# Patient Record
Sex: FEMALE | Race: WHITE | NOT HISPANIC OR LATINO
[De-identification: names, ages, dates, MRNs, and addresses within clinical notes are randomized per-mention and may not be internally consistent; named-entity substitution may affect disease eponyms.]

---

## 2021-06-09 ENCOUNTER — APPOINTMENT (OUTPATIENT)
Dept: ANTEPARTUM | Facility: CLINIC | Age: 31
End: 2021-06-09
Payer: COMMERCIAL

## 2021-06-09 ENCOUNTER — ASOB RESULT (OUTPATIENT)
Age: 31
End: 2021-06-09

## 2021-06-09 PROBLEM — Z00.00 ENCOUNTER FOR PREVENTIVE HEALTH EXAMINATION: Status: ACTIVE | Noted: 2021-06-09

## 2021-06-09 PROCEDURE — 99072 ADDL SUPL MATRL&STAF TM PHE: CPT

## 2021-06-09 PROCEDURE — 76813 OB US NUCHAL MEAS 1 GEST: CPT

## 2021-08-05 ENCOUNTER — ASOB RESULT (OUTPATIENT)
Age: 31
End: 2021-08-05

## 2021-08-05 ENCOUNTER — APPOINTMENT (OUTPATIENT)
Dept: ANTEPARTUM | Facility: CLINIC | Age: 31
End: 2021-08-05
Payer: COMMERCIAL

## 2021-08-05 PROCEDURE — 76811 OB US DETAILED SNGL FETUS: CPT

## 2021-08-17 ENCOUNTER — ASOB RESULT (OUTPATIENT)
Age: 31
End: 2021-08-17

## 2021-08-17 ENCOUNTER — APPOINTMENT (OUTPATIENT)
Dept: ANTEPARTUM | Facility: CLINIC | Age: 31
End: 2021-08-17
Payer: COMMERCIAL

## 2021-08-17 ENCOUNTER — NON-APPOINTMENT (OUTPATIENT)
Age: 31
End: 2021-08-17

## 2021-08-17 PROCEDURE — 76817 TRANSVAGINAL US OBSTETRIC: CPT

## 2021-09-02 ENCOUNTER — ASOB RESULT (OUTPATIENT)
Age: 31
End: 2021-09-02

## 2021-09-02 ENCOUNTER — APPOINTMENT (OUTPATIENT)
Dept: ANTEPARTUM | Facility: CLINIC | Age: 31
End: 2021-09-02
Payer: COMMERCIAL

## 2021-09-02 PROCEDURE — 76815 OB US LIMITED FETUS(S): CPT | Mod: 59

## 2021-09-02 PROCEDURE — 76817 TRANSVAGINAL US OBSTETRIC: CPT

## 2021-09-15 ENCOUNTER — ASOB RESULT (OUTPATIENT)
Age: 31
End: 2021-09-15

## 2021-09-15 ENCOUNTER — APPOINTMENT (OUTPATIENT)
Dept: MATERNAL FETAL MEDICINE | Facility: CLINIC | Age: 31
End: 2021-09-15
Payer: COMMERCIAL

## 2021-09-15 VITALS — WEIGHT: 173 LBS | BODY MASS INDEX: 30.65 KG/M2 | HEIGHT: 63 IN

## 2021-09-15 DIAGNOSIS — Z83.3 FAMILY HISTORY OF DIABETES MELLITUS: ICD-10-CM

## 2021-09-15 PROCEDURE — G0108 DIAB MANAGE TRN  PER INDIV: CPT | Mod: 95

## 2021-09-28 ENCOUNTER — TRANSCRIPTION ENCOUNTER (OUTPATIENT)
Age: 31
End: 2021-09-28

## 2021-09-28 ENCOUNTER — ASOB RESULT (OUTPATIENT)
Age: 31
End: 2021-09-28

## 2021-09-28 ENCOUNTER — APPOINTMENT (OUTPATIENT)
Dept: ANTEPARTUM | Facility: CLINIC | Age: 31
End: 2021-09-28
Payer: COMMERCIAL

## 2021-09-28 ENCOUNTER — APPOINTMENT (OUTPATIENT)
Dept: MATERNAL FETAL MEDICINE | Facility: CLINIC | Age: 31
End: 2021-09-28
Payer: COMMERCIAL

## 2021-09-28 VITALS
RESPIRATION RATE: 16 BRPM | DIASTOLIC BLOOD PRESSURE: 68 MMHG | SYSTOLIC BLOOD PRESSURE: 92 MMHG | WEIGHT: 173 LBS | BODY MASS INDEX: 30.65 KG/M2 | HEIGHT: 63 IN | HEART RATE: 76 BPM | OXYGEN SATURATION: 99 %

## 2021-09-28 DIAGNOSIS — Z86.59 PERSONAL HISTORY OF OTHER MENTAL AND BEHAVIORAL DISORDERS: ICD-10-CM

## 2021-09-28 PROCEDURE — 99204 OFFICE O/P NEW MOD 45 MIN: CPT

## 2021-09-28 PROCEDURE — 76816 OB US FOLLOW-UP PER FETUS: CPT

## 2021-09-28 RX ORDER — PNV NO.95/FERROUS FUM/FOLIC AC 28MG-0.8MG
27-0.8 TABLET ORAL DAILY
Refills: 0 | Status: ACTIVE | COMMUNITY
Start: 2021-09-28

## 2021-09-28 NOTE — ACTIVE PROBLEMS
[Hypertension] : no hypertension [Heart Disease] : no heart disease [Autoimmune Disease] : no autoimmune disease [Renal Disease] : no kidney disease, no UTI [Neurologic Disorder] : no neurologic disorder, no epilepsy [Depression] : no depression, no post partum depression [Hepatic Disorder] : no hepatitis, no liver disease [Thrombophlebitis] : no varicosities, no phlebitis [Thyroid Disorder] : no thyroid dysfunction [Trauma] : no trauma/violence [Blood Transfusion (___ Ml)] : no history of blood transfusion

## 2021-09-28 NOTE — VITALS
[LMP (date): ___] : LMP was on [unfilled] [GA =___ Weeks] : which calculates to a GA of [unfilled] weeks [GA= ___ Days] : and [unfilled] day(s) [AMOR by LMP (date): ___] : The calculated AMOR by LMP is [unfilled] [By LMP] : this is the final AMOR

## 2021-09-28 NOTE — FAMILY HISTORY
[Age 35+ During Pregnancy] : not 35 or over during pregnancy [Reported Family History Of Birth Defects] : no congenital heart defects [Murali-Sachs Carrier] : no Murali-Sachs [Family History] : no mental retardation/autism [Reported Family History Of Genetic Disease] : no history of child defect in child of baby father

## 2021-09-28 NOTE — DISCUSSION/SUMMARY
[FreeTextEntry1] : We had the pleasure of meeting with your patient, Jackie Goodwin, for a maternal-fetal medicine consultation. As you know, the patient is a 31-year-old  at 28 1/7 weeks with a pregnancy complicated by gestational diabetes and anxiety (no meds). \par \par She reports no complaints today. She reports good fetal movement and denies contractions, leaking and vaginal bleeding.\par \par She is currently taking prenatal vitamins. \par \par She was extensively counseled regarding the following issues:\par \par •	Gestational diabetes\par \par Jackie was counseled regarding diet, blood sugar testing, and managing diabetes during pregnancy. Tight glycemic control in pregnancy is necessary to decrease fetal and  risks including macrosomia, shoulder dystocia, medically or obstetrically-indicated  delivery,  section, polyhydramnios, and preeclampsia. It was discussed that women who are able to maintain good glycemic control with diet and/or medication generally have favorable obstetric outcomes. She understands that fasting glucose values should be <90 and 1-hour postprandial values should be <140. Her fingerstick log was reviewed. Fasting fingerstick glucose values are persistently elevated. Postprandial values are mostly at goal. She was instructed to continue checking fingersticks 4 times daily and to bring her log to all appointments. \par \par I recommend starting insulin NPH 12 units at bedtime—prescription sent to pharmacy. Her fingerstick log will be reassessed at her next visit to determine whether further adjustment of her insulin regimen is necessary. \par \par She is encouraged to have a two-hour glucose tolerance test at 6-8 weeks postpartum to evaluate for diabetes mellitus and insulin resistance. \par \par In general, a patient with gestational diabetes well-controlled on insulin should be delivered no earlier than 39 0/7 weeks and no later than 39 6/7 weeks. However, if glycemic control is determined to be poor then earlier delivery may be recommended to avoid fetopathy and stillbirth.\par \par She will follow-up with the diabetes educator/nutritionist on 10/13/21.\par Weekly fetal testing to start by 36 weeks.\par \par \par \par Thank you for requesting a consultation on this patient for gestational diabetes. The total time spent in preparation for this visit, medical history taking, orders, review of records, counseling the patient, and writing this note was 54 minutes.\par \par At the end of our discussion, the patient indicated that her questions were answered and she seemed satisfied with our discussion. Please do not hesitate to contact us with any questions.\par \par Sincerely,\par \par \par Dewayne Rivas MD, GUALBERTO\par Attending Physician, Maternal-Fetal Medicine\par \par

## 2021-09-28 NOTE — OB HISTORY
[LMP: ___] : LMP: [unfilled] [AMOR: ___] : AMOR: [unfilled] [EGA: ___ wks] : EGA: [unfilled] wks [Spontaneous] : Spontaneous conception [FreeTextEntry1] : Pt states she was taking Lexapro until about 12 weeks gestation due to Hx Anxiety. Pt states her OB weaned her off the medication. She reports that she is doing well. [Definite:  ___ (Date)] : the last menstrual period was [unfilled] [Normal Amount/Duration] : was of a normal amount and duration [Spotting Between  Menses] : no spotting between menses [Regular Cycle Intervals] : periods have been regular [Menstrual Cramps] : menstrual cramps

## 2021-10-11 ENCOUNTER — NON-APPOINTMENT (OUTPATIENT)
Age: 31
End: 2021-10-11

## 2021-10-13 ENCOUNTER — APPOINTMENT (OUTPATIENT)
Dept: MATERNAL FETAL MEDICINE | Facility: CLINIC | Age: 31
End: 2021-10-13
Payer: COMMERCIAL

## 2021-10-13 ENCOUNTER — ASOB RESULT (OUTPATIENT)
Age: 31
End: 2021-10-13

## 2021-10-13 VITALS — WEIGHT: 178 LBS | HEIGHT: 63 IN | BODY MASS INDEX: 31.54 KG/M2

## 2021-10-13 PROCEDURE — G0108 DIAB MANAGE TRN  PER INDIV: CPT | Mod: 95

## 2021-10-26 ENCOUNTER — APPOINTMENT (OUTPATIENT)
Dept: ANTEPARTUM | Facility: CLINIC | Age: 31
End: 2021-10-26

## 2021-10-26 ENCOUNTER — APPOINTMENT (OUTPATIENT)
Dept: MATERNAL FETAL MEDICINE | Facility: CLINIC | Age: 31
End: 2021-10-26

## 2021-10-27 ENCOUNTER — APPOINTMENT (OUTPATIENT)
Dept: MATERNAL FETAL MEDICINE | Facility: CLINIC | Age: 31
End: 2021-10-27
Payer: COMMERCIAL

## 2021-10-27 ENCOUNTER — APPOINTMENT (OUTPATIENT)
Dept: ANTEPARTUM | Facility: CLINIC | Age: 31
End: 2021-10-27
Payer: COMMERCIAL

## 2021-10-27 ENCOUNTER — ASOB RESULT (OUTPATIENT)
Age: 31
End: 2021-10-27

## 2021-10-27 VITALS
SYSTOLIC BLOOD PRESSURE: 118 MMHG | BODY MASS INDEX: 31.9 KG/M2 | RESPIRATION RATE: 18 BRPM | HEIGHT: 63 IN | WEIGHT: 180.06 LBS | OXYGEN SATURATION: 98 % | DIASTOLIC BLOOD PRESSURE: 70 MMHG | HEART RATE: 98 BPM

## 2021-10-27 PROCEDURE — 76820 UMBILICAL ARTERY ECHO: CPT

## 2021-10-27 PROCEDURE — 99214 OFFICE O/P EST MOD 30 MIN: CPT

## 2021-10-27 PROCEDURE — 76816 OB US FOLLOW-UP PER FETUS: CPT

## 2021-10-27 NOTE — DISCUSSION/SUMMARY
[FreeTextEntry1] : Repeat growth scan in 4 weeks with MFM consultaiton. \par \par Medical nutrition followup is scheduled in 2 weeks. \par \par Weekly fetal testing to begin next week.

## 2021-10-27 NOTE — DATA REVIEWED
[FreeTextEntry1] : Sonogram today shows good interval growth with the overall fetal size at the 64th percentile. \par \par Review of glucose control shows good post prandial control with occasional mild elevations due to food choices. She understands the diet and will continue to adjust as needed. \par \par Her fasting values remain consistently elevated, and she just increased her nighttime NPH to 48 units.  I also instructed Jackie to begin intermittently testing overnight (2am-3am) to determine if she is bottoming out at all, or if it is safe to continue increasing the night time insulin aggressively. \par \par A followup with medical nutrition is scheduled.

## 2021-11-01 ENCOUNTER — NON-APPOINTMENT (OUTPATIENT)
Age: 31
End: 2021-11-01

## 2021-11-09 ENCOUNTER — APPOINTMENT (OUTPATIENT)
Dept: MATERNAL FETAL MEDICINE | Facility: CLINIC | Age: 31
End: 2021-11-09
Payer: COMMERCIAL

## 2021-11-09 ENCOUNTER — ASOB RESULT (OUTPATIENT)
Age: 31
End: 2021-11-09

## 2021-11-09 VITALS — WEIGHT: 180 LBS | HEIGHT: 63 IN | BODY MASS INDEX: 31.89 KG/M2

## 2021-11-09 PROCEDURE — G0108 DIAB MANAGE TRN  PER INDIV: CPT | Mod: 95

## 2021-11-24 ENCOUNTER — ASOB RESULT (OUTPATIENT)
Age: 31
End: 2021-11-24

## 2021-11-24 ENCOUNTER — APPOINTMENT (OUTPATIENT)
Dept: ANTEPARTUM | Facility: CLINIC | Age: 31
End: 2021-11-24
Payer: COMMERCIAL

## 2021-11-24 PROCEDURE — 76820 UMBILICAL ARTERY ECHO: CPT

## 2021-11-24 PROCEDURE — 76816 OB US FOLLOW-UP PER FETUS: CPT

## 2021-12-01 ENCOUNTER — APPOINTMENT (OUTPATIENT)
Dept: MATERNAL FETAL MEDICINE | Facility: CLINIC | Age: 31
End: 2021-12-01
Payer: COMMERCIAL

## 2021-12-01 ENCOUNTER — APPOINTMENT (OUTPATIENT)
Dept: ANTEPARTUM | Facility: CLINIC | Age: 31
End: 2021-12-01
Payer: COMMERCIAL

## 2021-12-01 ENCOUNTER — ASOB RESULT (OUTPATIENT)
Age: 31
End: 2021-12-01

## 2021-12-01 VITALS
HEIGHT: 63 IN | WEIGHT: 184 LBS | OXYGEN SATURATION: 98 % | RESPIRATION RATE: 18 BRPM | SYSTOLIC BLOOD PRESSURE: 120 MMHG | DIASTOLIC BLOOD PRESSURE: 70 MMHG | HEART RATE: 100 BPM | BODY MASS INDEX: 32.6 KG/M2

## 2021-12-01 PROCEDURE — 76819 FETAL BIOPHYS PROFIL W/O NST: CPT

## 2021-12-01 PROCEDURE — 76820 UMBILICAL ARTERY ECHO: CPT

## 2021-12-01 PROCEDURE — 99214 OFFICE O/P EST MOD 30 MIN: CPT

## 2021-12-01 NOTE — DATA REVIEWED
[FreeTextEntry1] : Sonogram today shows good interval growth with the BPP scoring 8/8 and the MAMTA normal at 16cm\par \par Review of glucose shows overall good control with occasional mild elevations.  The overall control is adequate. \par \par She denies any episodes of hypo or hyperglycemic symptoms, and when testing overnight, her values were not dropping too low despite the high dose of nighttime NPH\par \par We discussed the importance of continuing to test for the duration of the pregnancy, and she will reach out if her values start to dramatically change.

## 2021-12-01 NOTE — HISTORY OF PRESENT ILLNESS
[FreeTextEntry1] : Jackie presents for interval growth sonogram and evaluation of gestational diabetes

## 2021-12-01 NOTE — DISCUSSION/SUMMARY
[FreeTextEntry1] : Weekly fetal testing. \par \par Followup with medical nutrition as needed. \par \par Delivery is scheduled in 2 weeks.

## 2021-12-07 ENCOUNTER — APPOINTMENT (OUTPATIENT)
Dept: MATERNAL FETAL MEDICINE | Facility: CLINIC | Age: 31
End: 2021-12-07

## 2023-12-28 ENCOUNTER — ASOB RESULT (OUTPATIENT)
Age: 33
End: 2023-12-28

## 2023-12-28 ENCOUNTER — APPOINTMENT (OUTPATIENT)
Dept: ANTEPARTUM | Facility: CLINIC | Age: 33
End: 2023-12-28
Payer: COMMERCIAL

## 2023-12-28 PROCEDURE — 76813 OB US NUCHAL MEAS 1 GEST: CPT

## 2024-01-23 ENCOUNTER — ASOB RESULT (OUTPATIENT)
Age: 34
End: 2024-01-23

## 2024-01-23 ENCOUNTER — APPOINTMENT (OUTPATIENT)
Dept: MATERNAL FETAL MEDICINE | Facility: CLINIC | Age: 34
End: 2024-01-23
Payer: COMMERCIAL

## 2024-01-23 PROCEDURE — G0108 DIAB MANAGE TRN  PER INDIV: CPT | Mod: 95

## 2024-01-23 RX ORDER — PEN NEEDLE, DIABETIC 29 G X1/2"
32G X 4 MM NEEDLE, DISPOSABLE MISCELLANEOUS
Qty: 1 | Refills: 2 | Status: DISCONTINUED | COMMUNITY
Start: 2021-09-28 | End: 2024-01-23

## 2024-01-23 RX ORDER — LANCETS 33 GAUGE
EACH MISCELLANEOUS
Qty: 1 | Refills: 7 | Status: ACTIVE | COMMUNITY
Start: 2024-01-23 | End: 1900-01-01

## 2024-01-23 RX ORDER — BLOOD-GLUCOSE METER
W/DEVICE EACH MISCELLANEOUS
Qty: 1 | Refills: 0 | Status: DISCONTINUED | COMMUNITY
Start: 2021-09-15 | End: 2024-01-23

## 2024-01-23 RX ORDER — BLOOD SUGAR DIAGNOSTIC
STRIP MISCELLANEOUS 4 TIMES DAILY
Qty: 1 | Refills: 6 | Status: ACTIVE | COMMUNITY
Start: 2024-01-23 | End: 1900-01-01

## 2024-01-23 RX ORDER — BLOOD-GLUCOSE METER
W/DEVICE EACH MISCELLANEOUS
Qty: 1 | Refills: 0 | Status: ACTIVE | COMMUNITY
Start: 2024-01-23 | End: 1900-01-01

## 2024-01-23 RX ORDER — BLOOD SUGAR DIAGNOSTIC
STRIP MISCELLANEOUS
Qty: 3 | Refills: 3 | Status: DISCONTINUED | COMMUNITY
Start: 2021-09-15 | End: 2024-01-23

## 2024-01-23 RX ORDER — HUMAN INSULIN 100 [IU]/ML
100 INJECTION, SUSPENSION SUBCUTANEOUS
Qty: 1 | Refills: 2 | Status: DISCONTINUED | COMMUNITY
Start: 2021-09-28 | End: 2024-01-23

## 2024-01-23 RX ORDER — LANCETS 33 GAUGE
EACH MISCELLANEOUS
Qty: 1 | Refills: 3 | Status: DISCONTINUED | COMMUNITY
Start: 2021-09-15 | End: 2024-01-23

## 2024-01-30 ENCOUNTER — ASOB RESULT (OUTPATIENT)
Age: 34
End: 2024-01-30

## 2024-01-30 ENCOUNTER — APPOINTMENT (OUTPATIENT)
Dept: MATERNAL FETAL MEDICINE | Facility: CLINIC | Age: 34
End: 2024-01-30
Payer: COMMERCIAL

## 2024-01-30 VITALS — BODY MASS INDEX: 31.01 KG/M2 | WEIGHT: 175 LBS | HEIGHT: 63 IN

## 2024-01-30 PROCEDURE — G0108 DIAB MANAGE TRN  PER INDIV: CPT | Mod: 95

## 2024-01-31 RX ORDER — INSULIN HUMAN 100 [IU]/ML
100 INJECTION, SUSPENSION SUBCUTANEOUS
Qty: 2 | Refills: 5 | Status: DISCONTINUED | COMMUNITY
Start: 2024-01-30 | End: 2024-01-31

## 2024-01-31 RX ORDER — BLOOD SUGAR DIAGNOSTIC
32G X 5 MM STRIP MISCELLANEOUS
Qty: 100 | Refills: 1 | Status: ACTIVE | COMMUNITY
Start: 2024-01-30 | End: 1900-01-01

## 2024-01-31 RX ORDER — ISOPROPYL ALCOHOL 0.7 ML/ML
SWAB TOPICAL
Qty: 1 | Refills: 1 | Status: ACTIVE | COMMUNITY
Start: 2024-01-30 | End: 1900-01-01

## 2024-02-08 ENCOUNTER — APPOINTMENT (OUTPATIENT)
Dept: MATERNAL FETAL MEDICINE | Facility: CLINIC | Age: 34
End: 2024-02-08
Payer: COMMERCIAL

## 2024-02-08 ENCOUNTER — ASOB RESULT (OUTPATIENT)
Age: 34
End: 2024-02-08

## 2024-02-08 VITALS — WEIGHT: 179 LBS | HEIGHT: 63 IN | BODY MASS INDEX: 31.71 KG/M2

## 2024-02-08 PROCEDURE — G0108 DIAB MANAGE TRN  PER INDIV: CPT | Mod: 95

## 2024-02-21 ENCOUNTER — APPOINTMENT (OUTPATIENT)
Dept: ANTEPARTUM | Facility: CLINIC | Age: 34
End: 2024-02-21
Payer: COMMERCIAL

## 2024-02-21 ENCOUNTER — ASOB RESULT (OUTPATIENT)
Age: 34
End: 2024-02-21

## 2024-02-21 PROCEDURE — 76817 TRANSVAGINAL US OBSTETRIC: CPT

## 2024-02-21 PROCEDURE — 76811 OB US DETAILED SNGL FETUS: CPT

## 2024-02-22 ENCOUNTER — APPOINTMENT (OUTPATIENT)
Dept: ANTEPARTUM | Facility: CLINIC | Age: 34
End: 2024-02-22

## 2024-02-22 ENCOUNTER — APPOINTMENT (OUTPATIENT)
Dept: MATERNAL FETAL MEDICINE | Facility: CLINIC | Age: 34
End: 2024-02-22
Payer: COMMERCIAL

## 2024-02-22 VITALS
WEIGHT: 194 LBS | HEART RATE: 88 BPM | SYSTOLIC BLOOD PRESSURE: 110 MMHG | BODY MASS INDEX: 34.38 KG/M2 | RESPIRATION RATE: 16 BRPM | DIASTOLIC BLOOD PRESSURE: 64 MMHG | HEIGHT: 63 IN

## 2024-02-22 DIAGNOSIS — O24.419 GESTATIONAL DIABETES MELLITUS IN PREGNANCY, UNSPECIFIED CONTROL: ICD-10-CM

## 2024-02-22 DIAGNOSIS — O24.414 GESTATIONAL DIABETES MELLITUS IN PREGNANCY, INSULIN CONTROLLED: ICD-10-CM

## 2024-02-22 PROCEDURE — 99204 OFFICE O/P NEW MOD 45 MIN: CPT

## 2024-02-22 NOTE — DISCUSSION/SUMMARY
[FreeTextEntry1] : Repeat sonogram for fetal growth is scheduled in 4 weeks, with MFM reconsultation scheduled.   Followup with medical nutrition team is scheduled.   Fetal echo referral given   Continue current insulin dose as discussed above.

## 2024-02-22 NOTE — DATA REVIEWED
[FreeTextEntry1] : Level 2 report was reviewed. The limited views of the cardiac anatomy were discussed, and a fetal echo is being scheduled to complete the fetal survey  We discussed the recommendations of increased monitoring of fetal growth and well being throughout the second and third trimester with GDM, and a followup appointment  was given.  Review of home glucose shows overall good control of her post prandial values, as Jackie has a good grasp of the diabetic diet. She finds the telemedicine visits with the medical nutrition team to be helpful.   The fasting values remain mildly elevated depite increasing doses of insulin, and she is currently taking 28u NPH QHS which was just increased last night. We discussed maintining that dose for a few days, and potentially testing overnight to assess the utility of further increases.

## 2024-03-06 ENCOUNTER — APPOINTMENT (OUTPATIENT)
Dept: ANTEPARTUM | Facility: CLINIC | Age: 34
End: 2024-03-06

## 2024-03-12 ENCOUNTER — APPOINTMENT (OUTPATIENT)
Dept: PEDIATRIC CARDIOLOGY | Facility: CLINIC | Age: 34
End: 2024-03-12
Payer: COMMERCIAL

## 2024-03-12 PROCEDURE — 76827 ECHO EXAM OF FETAL HEART: CPT

## 2024-03-12 PROCEDURE — 93325 DOPPLER ECHO COLOR FLOW MAPG: CPT

## 2024-03-12 PROCEDURE — 99202 OFFICE O/P NEW SF 15 MIN: CPT

## 2024-03-12 PROCEDURE — 76825 ECHO EXAM OF FETAL HEART: CPT

## 2024-03-14 ENCOUNTER — ASOB RESULT (OUTPATIENT)
Age: 34
End: 2024-03-14

## 2024-03-14 ENCOUNTER — APPOINTMENT (OUTPATIENT)
Dept: MATERNAL FETAL MEDICINE | Facility: CLINIC | Age: 34
End: 2024-03-14
Payer: COMMERCIAL

## 2024-03-14 PROCEDURE — G0108 DIAB MANAGE TRN  PER INDIV: CPT | Mod: 95

## 2024-03-14 RX ORDER — HUMAN INSULIN 100 [IU]/ML
100 INJECTION, SUSPENSION SUBCUTANEOUS
Qty: 1 | Refills: 3 | Status: ACTIVE | COMMUNITY
Start: 2024-03-14 | End: 1900-01-01

## 2024-03-14 RX ORDER — HUMAN INSULIN 100 [IU]/ML
100 INJECTION, SUSPENSION SUBCUTANEOUS
Qty: 1 | Refills: 1 | Status: DISCONTINUED | COMMUNITY
Start: 2024-01-31 | End: 2024-03-14

## 2024-03-21 ENCOUNTER — APPOINTMENT (OUTPATIENT)
Dept: MATERNAL FETAL MEDICINE | Facility: CLINIC | Age: 34
End: 2024-03-21
Payer: COMMERCIAL

## 2024-03-21 ENCOUNTER — APPOINTMENT (OUTPATIENT)
Dept: ANTEPARTUM | Facility: CLINIC | Age: 34
End: 2024-03-21
Payer: COMMERCIAL

## 2024-03-21 ENCOUNTER — ASOB RESULT (OUTPATIENT)
Age: 34
End: 2024-03-21

## 2024-03-21 ENCOUNTER — APPOINTMENT (OUTPATIENT)
Dept: ANTEPARTUM | Facility: CLINIC | Age: 34
End: 2024-03-21

## 2024-03-21 VITALS
HEART RATE: 74 BPM | BODY MASS INDEX: 36.14 KG/M2 | WEIGHT: 204 LBS | DIASTOLIC BLOOD PRESSURE: 68 MMHG | RESPIRATION RATE: 18 BRPM | OXYGEN SATURATION: 98 % | HEIGHT: 63 IN | SYSTOLIC BLOOD PRESSURE: 106 MMHG

## 2024-03-21 DIAGNOSIS — O09.891 SUPERVISION OF OTHER HIGH RISK PREGNANCIES, FIRST TRIMESTER: ICD-10-CM

## 2024-03-21 DIAGNOSIS — O99.210 OBESITY COMPLICATING PREGNANCY, UNSPECIFIED TRIMESTER: ICD-10-CM

## 2024-03-21 PROCEDURE — 76816 OB US FOLLOW-UP PER FETUS: CPT

## 2024-03-21 PROCEDURE — 99214 OFFICE O/P EST MOD 30 MIN: CPT

## 2024-03-21 RX ORDER — ESCITALOPRAM OXALATE 20 MG/1
20 TABLET ORAL
Refills: 0 | Status: ACTIVE | COMMUNITY

## 2024-03-21 NOTE — DISCUSSION/SUMMARY
[FreeTextEntry1] : We had the pleasure of seeing your patient for a Maternal-Fetal Medicine consultation today. She is a 34 year-old  at 24 6/7 weeks of gestation with a pregnancy complicated by the below issues.  She was extensively counseled regarding the following issues:  Gestational diabetes on insulin: The patient was counseled regarding diet, blood sugar testing, and managing diabetes during pregnancy. Tight glycemic control in pregnancy is necessary to decrease fetal and  risks including macrosomia, shoulder dystocia, medically or obstetrically-indicated  delivery,  section, polyhydramnios, and preeclampsia. It was discussed that women who are able to maintain good glycemic control with diet and/or medication generally have favorable obstetric outcomes. She understands that fasting glucose values should be <95 and 1-hour postprandial values should be <140. Fingersticks should be checked and logged 4 times daily. She is using a CDM device to monitor glucose levels. Currently, her fasting and postprandial glucose values are mostly at goal. She is encouraged to have a two-hour glucose tolerance test at 6-8 weeks postpartum to evaluate for diabetes mellitus and insulin resistance.   Obesity, class 1: Obesity is associated with an increased risk for pregnancy complications such as preeclampsia. Complications from surgery are increased, as well as failure of regional anesthesia. In addition, the fetus is at increased risk for congenital anomalies, most commonly neural tube defects and cardiac anomalies, even in the absence of diabetes.  Malformations are more difficult to assess by ultrasound evaluation due to the decreased sensitivity of ultrasound in women with a high BMI. During pregnancy, optimum weight gain recommended by the [place] [employer] 2009 Guidelines is 11-20 pounds. Furthermore, pregnant women with obesity are at a greater risk for venous thromboembolism.  Anxiety: continue lexapro  Recommend: Weekly  fetal surveillance starting at 32 weeks Serial growth ultrasounds every 4 weeks Continue insulin with titration as needed Nutrition follow-up Delivery at 39 weeks of gestation unless otherwise indicated sooner   Thank you for requesting a consultation on this patient. The total time spent in preparation for this visit, medical history taking, orders, review of records, counseling the patient, and writing this note was 35 minutes.  At the end of our discussion, the patient indicated that her questions were answered and she seemed satisfied with our discussion. Please do not hesitate to contact us with any questions.  Sincerely,   Dewayne Rivas MD, GUALBERTO Attending Physician, Maternal-Fetal Medicine

## 2024-04-04 ENCOUNTER — ASOB RESULT (OUTPATIENT)
Age: 34
End: 2024-04-04

## 2024-04-04 ENCOUNTER — APPOINTMENT (OUTPATIENT)
Dept: MATERNAL FETAL MEDICINE | Facility: CLINIC | Age: 34
End: 2024-04-04
Payer: COMMERCIAL

## 2024-04-04 VITALS — HEIGHT: 63 IN | WEIGHT: 204 LBS | BODY MASS INDEX: 36.14 KG/M2

## 2024-04-04 PROCEDURE — G0108 DIAB MANAGE TRN  PER INDIV: CPT | Mod: 95

## 2024-04-18 ENCOUNTER — APPOINTMENT (OUTPATIENT)
Dept: MATERNAL FETAL MEDICINE | Facility: CLINIC | Age: 34
End: 2024-04-18
Payer: COMMERCIAL

## 2024-04-18 ENCOUNTER — APPOINTMENT (OUTPATIENT)
Dept: ANTEPARTUM | Facility: CLINIC | Age: 34
End: 2024-04-18
Payer: COMMERCIAL

## 2024-04-18 ENCOUNTER — ASOB RESULT (OUTPATIENT)
Age: 34
End: 2024-04-18

## 2024-04-18 VITALS
HEART RATE: 100 BPM | RESPIRATION RATE: 18 BRPM | DIASTOLIC BLOOD PRESSURE: 66 MMHG | WEIGHT: 209 LBS | BODY MASS INDEX: 37.03 KG/M2 | OXYGEN SATURATION: 97 % | SYSTOLIC BLOOD PRESSURE: 106 MMHG | HEIGHT: 63 IN

## 2024-04-18 DIAGNOSIS — F41.9 OTHER MENTAL DISORDERS COMPLICATING PREGNANCY, UNSPECIFIED TRIMESTER: ICD-10-CM

## 2024-04-18 DIAGNOSIS — O99.340 OTHER MENTAL DISORDERS COMPLICATING PREGNANCY, UNSPECIFIED TRIMESTER: ICD-10-CM

## 2024-04-18 PROCEDURE — 76816 OB US FOLLOW-UP PER FETUS: CPT

## 2024-04-18 PROCEDURE — 99214 OFFICE O/P EST MOD 30 MIN: CPT

## 2024-04-18 PROCEDURE — 76819 FETAL BIOPHYS PROFIL W/O NST: CPT

## 2024-04-18 PROCEDURE — 76820 UMBILICAL ARTERY ECHO: CPT | Mod: 59

## 2024-04-18 NOTE — DATA REVIEWED
[FreeTextEntry1] : Sonogram today shows good fetal growth with the overall size at the 53rd percentile. The AC is advanced at the 84th percentile and interval growth monitoring is planned.   \Review of home glucose shows overall good control. Asha has been adjusting her nighttime NPH insulin to better control her fasting values. She recently had to reduce to 54u due to some hypoglycemic numbers, but seems to be well managed at this time. Her post prandial numbers are well controlled.   We discussed weekly fetal testing, which asha reports are being performed in your office.   A followup with Medical nutrition team was confirmed

## 2024-04-18 NOTE — DISCUSSION/SUMMARY
[FreeTextEntry1] : Continue on current insulin regimen, with adjustments as needed. Continue testing 4X daily  Repeat growth scan in 4 weeks with MFM consult at that time.   Followup is scheduled for medical nutrition in 2 weeks.

## 2024-05-02 ENCOUNTER — APPOINTMENT (OUTPATIENT)
Dept: MATERNAL FETAL MEDICINE | Facility: CLINIC | Age: 34
End: 2024-05-02
Payer: COMMERCIAL

## 2024-05-02 ENCOUNTER — ASOB RESULT (OUTPATIENT)
Age: 34
End: 2024-05-02

## 2024-05-02 VITALS — HEIGHT: 63 IN | BODY MASS INDEX: 37.03 KG/M2 | WEIGHT: 209 LBS

## 2024-05-02 PROCEDURE — G0108 DIAB MANAGE TRN  PER INDIV: CPT | Mod: 95

## 2024-05-22 ENCOUNTER — APPOINTMENT (OUTPATIENT)
Dept: ANTEPARTUM | Facility: CLINIC | Age: 34
End: 2024-05-22
Payer: COMMERCIAL

## 2024-05-22 ENCOUNTER — ASOB RESULT (OUTPATIENT)
Age: 34
End: 2024-05-22

## 2024-05-22 ENCOUNTER — APPOINTMENT (OUTPATIENT)
Dept: MATERNAL FETAL MEDICINE | Facility: CLINIC | Age: 34
End: 2024-05-22
Payer: COMMERCIAL

## 2024-05-22 VITALS
DIASTOLIC BLOOD PRESSURE: 66 MMHG | OXYGEN SATURATION: 97 % | HEIGHT: 63 IN | SYSTOLIC BLOOD PRESSURE: 114 MMHG | RESPIRATION RATE: 18 BRPM | HEART RATE: 92 BPM | WEIGHT: 210 LBS | BODY MASS INDEX: 37.21 KG/M2

## 2024-05-22 PROCEDURE — 76820 UMBILICAL ARTERY ECHO: CPT | Mod: 59

## 2024-05-22 PROCEDURE — 76819 FETAL BIOPHYS PROFIL W/O NST: CPT

## 2024-05-22 PROCEDURE — 76816 OB US FOLLOW-UP PER FETUS: CPT

## 2024-05-22 PROCEDURE — 99214 OFFICE O/P EST MOD 30 MIN: CPT

## 2024-05-22 NOTE — DISCUSSION/SUMMARY
[FreeTextEntry1] : Weekly NST/BPP is recommended.   Continue current insulin plan  Followup growth in 4 weeks is scheduled.   Schedule an ECV at [facility] if fetus remains breech at 36 weeks.

## 2024-05-22 NOTE — DATA REVIEWED
[FreeTextEntry1] : Sonogram todah shows good fetal growth with the overall size at the 65th percentile.   Of note, the fetus is breech, and a long conversation of the options with fetal malpresentation was had. Jackie is motivated to try to avoid a , and with the chance of spontaneous version diminishing with each passing week, we discussed the timing of an attempt at external cephalic version taking place around 36-37 weeks of the fetus remains breech.   Since this taked place on Labor and delivery, this should be scheduled with the MFM team at   Regarding her glucose control, she continues to do well with both her fasting and post prandial values. She is currentlu taking 56u QHS and will adjust as needed over the next few weeks.  Weekly fetal testing is advised, with delivery by 39 weeks.

## 2024-06-04 ENCOUNTER — ASOB RESULT (OUTPATIENT)
Age: 34
End: 2024-06-04

## 2024-06-04 ENCOUNTER — APPOINTMENT (OUTPATIENT)
Dept: MATERNAL FETAL MEDICINE | Facility: CLINIC | Age: 34
End: 2024-06-04
Payer: COMMERCIAL

## 2024-06-04 VITALS — HEIGHT: 63 IN | BODY MASS INDEX: 37.21 KG/M2 | WEIGHT: 210 LBS

## 2024-06-04 PROCEDURE — G0108 DIAB MANAGE TRN  PER INDIV: CPT | Mod: 95

## 2024-06-06 ENCOUNTER — APPOINTMENT (OUTPATIENT)
Dept: ANTEPARTUM | Facility: CLINIC | Age: 34
End: 2024-06-06
Payer: COMMERCIAL

## 2024-06-06 ENCOUNTER — ASOB RESULT (OUTPATIENT)
Age: 34
End: 2024-06-06

## 2024-06-06 PROCEDURE — 76820 UMBILICAL ARTERY ECHO: CPT

## 2024-06-06 PROCEDURE — 76819 FETAL BIOPHYS PROFIL W/O NST: CPT

## 2024-06-19 ENCOUNTER — APPOINTMENT (OUTPATIENT)
Dept: ANTEPARTUM | Facility: CLINIC | Age: 34
End: 2024-06-19

## 2024-06-19 ENCOUNTER — ASOB RESULT (OUTPATIENT)
Age: 34
End: 2024-06-19

## 2024-06-19 ENCOUNTER — APPOINTMENT (OUTPATIENT)
Dept: ANTEPARTUM | Facility: CLINIC | Age: 34
End: 2024-06-19
Payer: COMMERCIAL

## 2024-06-19 ENCOUNTER — APPOINTMENT (OUTPATIENT)
Dept: MATERNAL FETAL MEDICINE | Facility: CLINIC | Age: 34
End: 2024-06-19
Payer: COMMERCIAL

## 2024-06-19 VITALS
RESPIRATION RATE: 18 BRPM | HEART RATE: 90 BPM | BODY MASS INDEX: 37.74 KG/M2 | WEIGHT: 213 LBS | DIASTOLIC BLOOD PRESSURE: 70 MMHG | HEIGHT: 63 IN | SYSTOLIC BLOOD PRESSURE: 120 MMHG | OXYGEN SATURATION: 98 %

## 2024-06-19 DIAGNOSIS — O24.414 GESTATIONAL DIABETES MELLITUS IN PREGNANCY, INSULIN CONTROLLED: ICD-10-CM

## 2024-06-19 DIAGNOSIS — O32.9XX0 MATERNAL CARE FOR MALPRESENTATION OF FETUS, UNSPECIFIED, NOT APPLICABLE OR UNSPECIFIED: ICD-10-CM

## 2024-06-19 PROCEDURE — 99214 OFFICE O/P EST MOD 30 MIN: CPT

## 2024-06-19 PROCEDURE — 76816 OB US FOLLOW-UP PER FETUS: CPT

## 2024-06-19 PROCEDURE — 76820 UMBILICAL ARTERY ECHO: CPT | Mod: 59

## 2024-06-19 PROCEDURE — 76819 FETAL BIOPHYS PROFIL W/O NST: CPT

## 2024-06-19 NOTE — DISCUSSION/SUMMARY
[FreeTextEntry1] : NST later today in Dr Canela office.   If reassuring, then a repeat BPP is scheduled in [place] on Friday  Continue insulin as ordered.

## 2024-06-19 NOTE — DATA REVIEWED
[FreeTextEntry1] : Sonogram today shows the fetus to continue being in breech presentation.   Good interval growth is noted with the overall size at the 49th percentile.   Review of home glucose shows good control of fasting and post prandial values, as she continues to increase the Novolin to 60u QHS  SHe has decided agains a trial for version, instead planning a  if the baby remains breech.   Of note, during todays BPP, there was sluggish movement giving the score of 6/8, although she reports good movement during the day. She is scheduled for NST in your office today. If that is reassuring making the BPP8/10, then a followup BPP is scheduled here on Friday

## 2024-06-21 ENCOUNTER — APPOINTMENT (OUTPATIENT)
Dept: ANTEPARTUM | Facility: CLINIC | Age: 34
End: 2024-06-21
Payer: COMMERCIAL

## 2024-06-21 ENCOUNTER — ASOB RESULT (OUTPATIENT)
Age: 34
End: 2024-06-21

## 2024-06-21 PROCEDURE — 76819 FETAL BIOPHYS PROFIL W/O NST: CPT

## 2024-06-26 ENCOUNTER — ASOB RESULT (OUTPATIENT)
Age: 34
End: 2024-06-26

## 2024-06-26 ENCOUNTER — APPOINTMENT (OUTPATIENT)
Dept: ANTEPARTUM | Facility: CLINIC | Age: 34
End: 2024-06-26
Payer: COMMERCIAL

## 2024-06-26 PROCEDURE — 76819 FETAL BIOPHYS PROFIL W/O NST: CPT

## 2024-06-26 PROCEDURE — 76820 UMBILICAL ARTERY ECHO: CPT

## 2024-07-30 ENCOUNTER — RX RENEWAL (OUTPATIENT)
Age: 34
End: 2024-07-30

## 2025-05-11 ENCOUNTER — EMERGENCY (EMERGENCY)
Facility: HOSPITAL | Age: 35
LOS: 0 days | Discharge: ANOTHER HOSPITAL | End: 2025-05-11
Attending: STUDENT IN AN ORGANIZED HEALTH CARE EDUCATION/TRAINING PROGRAM
Payer: COMMERCIAL

## 2025-05-11 ENCOUNTER — INPATIENT (INPATIENT)
Facility: HOSPITAL | Age: 35
LOS: 3 days | Discharge: HOME | End: 2025-05-15
Attending: STUDENT IN AN ORGANIZED HEALTH CARE EDUCATION/TRAINING PROGRAM | Admitting: STUDENT IN AN ORGANIZED HEALTH CARE EDUCATION/TRAINING PROGRAM
Payer: COMMERCIAL

## 2025-05-11 ENCOUNTER — TRANSCRIPTION ENCOUNTER (OUTPATIENT)
Age: 35
End: 2025-05-11

## 2025-05-11 VITALS
RESPIRATION RATE: 16 BRPM | SYSTOLIC BLOOD PRESSURE: 110 MMHG | TEMPERATURE: 98 F | HEART RATE: 88 BPM | DIASTOLIC BLOOD PRESSURE: 75 MMHG | OXYGEN SATURATION: 100 %

## 2025-05-11 VITALS
WEIGHT: 143.08 LBS | TEMPERATURE: 98 F | SYSTOLIC BLOOD PRESSURE: 109 MMHG | DIASTOLIC BLOOD PRESSURE: 67 MMHG | RESPIRATION RATE: 18 BRPM | HEIGHT: 63 IN | OXYGEN SATURATION: 98 % | HEART RATE: 70 BPM

## 2025-05-11 VITALS
SYSTOLIC BLOOD PRESSURE: 112 MMHG | HEIGHT: 63 IN | DIASTOLIC BLOOD PRESSURE: 79 MMHG | WEIGHT: 139.99 LBS | RESPIRATION RATE: 19 BRPM | TEMPERATURE: 98 F | OXYGEN SATURATION: 99 % | HEART RATE: 103 BPM

## 2025-05-11 DIAGNOSIS — Z88.2 ALLERGY STATUS TO SULFONAMIDES: ICD-10-CM

## 2025-05-11 DIAGNOSIS — S02.612A FRACTURE OF CONDYLAR PROCESS OF LEFT MANDIBLE, INITIAL ENCOUNTER FOR CLOSED FRACTURE: ICD-10-CM
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
## 2025-05-11 DIAGNOSIS — F41.9 ANXIETY DISORDER, UNSPECIFIED: ICD-10-CM

## 2025-05-11 DIAGNOSIS — S01.81XA LACERATION WITHOUT FOREIGN BODY OF OTHER PART OF HEAD, INITIAL ENCOUNTER: ICD-10-CM

## 2025-05-11 DIAGNOSIS — E78.5 HYPERLIPIDEMIA, UNSPECIFIED: ICD-10-CM
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
## 2025-05-11 DIAGNOSIS — Z23 ENCOUNTER FOR IMMUNIZATION: ICD-10-CM

## 2025-05-11 DIAGNOSIS — R55 SYNCOPE AND COLLAPSE: ICD-10-CM

## 2025-05-11 LAB
ALBUMIN SERPL ELPH-MCNC: 3.8 G/DL — SIGNIFICANT CHANGE UP (ref 3.3–5)
ALP SERPL-CCNC: 69 U/L — SIGNIFICANT CHANGE UP (ref 40–120)
ALT FLD-CCNC: 97 U/L — HIGH (ref 12–78)
ANION GAP SERPL CALC-SCNC: 7 MMOL/L — SIGNIFICANT CHANGE UP (ref 5–17)
APTT BLD: 29.9 SEC — SIGNIFICANT CHANGE UP (ref 26.1–36.8)
AST SERPL-CCNC: 57 U/L — HIGH (ref 15–37)
BASOPHILS # BLD AUTO: 0 K/UL — SIGNIFICANT CHANGE UP (ref 0–0.2)
BASOPHILS NFR BLD AUTO: 0 % — SIGNIFICANT CHANGE UP (ref 0–2)
BILIRUB SERPL-MCNC: 0.7 MG/DL — SIGNIFICANT CHANGE UP (ref 0.2–1.2)
BUN SERPL-MCNC: 16 MG/DL — SIGNIFICANT CHANGE UP (ref 7–23)
CALCIUM SERPL-MCNC: 8.8 MG/DL — SIGNIFICANT CHANGE UP (ref 8.5–10.1)
CHLORIDE SERPL-SCNC: 106 MMOL/L — SIGNIFICANT CHANGE UP (ref 96–108)
CO2 SERPL-SCNC: 24 MMOL/L — SIGNIFICANT CHANGE UP (ref 22–31)
CREAT SERPL-MCNC: 0.79 MG/DL — SIGNIFICANT CHANGE UP (ref 0.5–1.3)
D DIMER BLD IA.RAPID-MCNC: 334 NG/ML DDU — HIGH
EGFR: 99 ML/MIN/1.73M2 — SIGNIFICANT CHANGE UP
EGFR: 99 ML/MIN/1.73M2 — SIGNIFICANT CHANGE UP
EOSINOPHIL # BLD AUTO: 0.12 K/UL — SIGNIFICANT CHANGE UP (ref 0–0.5)
EOSINOPHIL NFR BLD AUTO: 4 % — SIGNIFICANT CHANGE UP (ref 0–6)
GLUCOSE SERPL-MCNC: 83 MG/DL — SIGNIFICANT CHANGE UP (ref 70–99)
HCT VFR BLD CALC: 41.6 % — SIGNIFICANT CHANGE UP (ref 34.5–45)
HGB BLD-MCNC: 14.1 G/DL — SIGNIFICANT CHANGE UP (ref 11.5–15.5)
INR BLD: 0.99 RATIO — SIGNIFICANT CHANGE UP (ref 0.85–1.16)
LYMPHOCYTES # BLD AUTO: 0.98 K/UL — LOW (ref 1–3.3)
LYMPHOCYTES # BLD AUTO: 33 % — SIGNIFICANT CHANGE UP (ref 13–44)
MAGNESIUM SERPL-MCNC: 2.1 MG/DL — SIGNIFICANT CHANGE UP (ref 1.6–2.6)
MCHC RBC-ENTMCNC: 28.9 PG — SIGNIFICANT CHANGE UP (ref 27–34)
MCHC RBC-ENTMCNC: 33.9 G/DL — SIGNIFICANT CHANGE UP (ref 32–36)
MCV RBC AUTO: 85.2 FL — SIGNIFICANT CHANGE UP (ref 80–100)
MONOCYTES # BLD AUTO: 0.33 K/UL — SIGNIFICANT CHANGE UP (ref 0–0.9)
MONOCYTES NFR BLD AUTO: 11 % — SIGNIFICANT CHANGE UP (ref 2–14)
NEUTROPHILS # BLD AUTO: 1.54 K/UL — LOW (ref 1.8–7.4)
NEUTROPHILS NFR BLD AUTO: 52 % — SIGNIFICANT CHANGE UP (ref 43–77)
NRBC BLD AUTO-RTO: SIGNIFICANT CHANGE UP /100 WBCS (ref 0–0)
PLATELET # BLD AUTO: 177 K/UL — SIGNIFICANT CHANGE UP (ref 150–400)
POTASSIUM SERPL-MCNC: 3.7 MMOL/L — SIGNIFICANT CHANGE UP (ref 3.5–5.3)
POTASSIUM SERPL-SCNC: 3.7 MMOL/L — SIGNIFICANT CHANGE UP (ref 3.5–5.3)
PROT SERPL-MCNC: 7.3 GM/DL — SIGNIFICANT CHANGE UP (ref 6–8.3)
PROTHROM AB SERPL-ACNC: 11.5 SEC — SIGNIFICANT CHANGE UP (ref 9.9–13.4)
RBC # BLD: 4.88 M/UL — SIGNIFICANT CHANGE UP (ref 3.8–5.2)
RBC # FLD: 12.2 % — SIGNIFICANT CHANGE UP (ref 10.3–14.5)
SODIUM SERPL-SCNC: 137 MMOL/L — SIGNIFICANT CHANGE UP (ref 135–145)
TROPONIN I, HIGH SENSITIVITY RESULT: 18.6 NG/L — SIGNIFICANT CHANGE UP
WBC # BLD: 2.97 K/UL — LOW (ref 3.8–10.5)
WBC # FLD AUTO: 2.97 K/UL — LOW (ref 3.8–10.5)

## 2025-05-11 PROCEDURE — 71275 CT ANGIOGRAPHY CHEST: CPT | Mod: 26

## 2025-05-11 PROCEDURE — 70486 CT MAXILLOFACIAL W/O DYE: CPT | Mod: 26

## 2025-05-11 PROCEDURE — 70450 CT HEAD/BRAIN W/O DYE: CPT | Mod: 26

## 2025-05-11 PROCEDURE — 71046 X-RAY EXAM CHEST 2 VIEWS: CPT | Mod: 26

## 2025-05-11 PROCEDURE — 93010 ELECTROCARDIOGRAM REPORT: CPT

## 2025-05-11 PROCEDURE — 99285 EMERGENCY DEPT VISIT HI MDM: CPT

## 2025-05-11 RX ORDER — ACETAMINOPHEN 500 MG/5ML
1000 LIQUID (ML) ORAL ONCE
Refills: 0 | Status: COMPLETED | OUTPATIENT
Start: 2025-05-11 | End: 2025-05-11

## 2025-05-11 RX ORDER — CLOSTRIDIUM TETANI TOXOID ANTIGEN (FORMALDEHYDE INACTIVATED), CORYNEBACTERIUM DIPHTHERIAE TOXOID ANTIGEN (FORMALDEHYDE INACTIVATED), BORDETELLA PERTUSSIS TOXOID ANTIGEN (GLUTARALDEHYDE INACTIVATED), BORDETELLA PERTUSSIS FILAMENTOUS HEMAGGLUTININ ANTIGEN (FORMALDEHYDE INACTIVATED), BORDETELLA PERTUSSIS PERTACTIN ANTIGEN, AND BORDETELLA PERTUSSIS FIMBRIAE 2/3 ANTIGEN 5; 2; 2.5; 5; 3; 5 [LF]/.5ML; [LF]/.5ML; UG/.5ML; UG/.5ML; UG/.5ML; UG/.5ML
0.5 INJECTION, SUSPENSION INTRAMUSCULAR ONCE
Refills: 0 | Status: COMPLETED | OUTPATIENT
Start: 2025-05-11 | End: 2025-05-11

## 2025-05-11 RX ADMIN — Medication 400 MILLIGRAM(S): at 16:27

## 2025-05-11 RX ADMIN — CLOSTRIDIUM TETANI TOXOID ANTIGEN (FORMALDEHYDE INACTIVATED), CORYNEBACTERIUM DIPHTHERIAE TOXOID ANTIGEN (FORMALDEHYDE INACTIVATED), BORDETELLA PERTUSSIS TOXOID ANTIGEN (GLUTARALDEHYDE INACTIVATED), BORDETELLA PERTUSSIS FILAMENTOUS HEMAGGLUTININ ANTIGEN (FORMALDEHYDE INACTIVATED), BORDETELLA PERTUSSIS PERTACTIN ANTIGEN, AND BORDETELLA PERTUSSIS FIMBRIAE 2/3 ANTIGEN 0.5 MILLILITER(S): 5; 2; 2.5; 5; 3; 5 INJECTION, SUSPENSION INTRAMUSCULAR at 16:32

## 2025-05-11 RX ADMIN — Medication 1000 MILLILITER(S): at 16:27

## 2025-05-11 NOTE — ED ADULT NURSE NOTE - NS_NURSE_BED_LOCATION_ED_ALL_ED
Patient retrieved numbers from her phone. Patient educated that she will not receive her phone again until discharge and the patient understood. Patient's valuables placed in a security bag and sent to the safe. No

## 2025-05-11 NOTE — ED PROVIDER NOTE - ATTENDING APP SHARED VISIT CONTRIBUTION OF CARE
35-year-old female with a past medical history of anxiety/depression, hyperlipidemia, who presents for evaluation of syncopal event.  She reports that she was cooking something in the kitchen when she felt lightheaded and placed her arms and then fainted.  She denies any associated chest pain or shortness of breath.  She reports significant left upper dental pain and jaw pain and swelling.  She denies any neck pain or back pain.  She notes a chin laceration.  Agree with physical exam as above- notable for small left upper molar tooth cip, chin laceration, left mandibular pain and swelling, trismus, no focal deficits, no midline spinal tenderness.    Plan for CT head and facial bones to evaluate for fracture or traumatic injury, check troponin to rule out electrolyte abnormalities or anemia D-dimer to further risk stratify for PE as patient on OCPs.  EKG nonischemic  - ct notable for left mandibular fx, d/w OMFS for transfer

## 2025-05-11 NOTE — ED ADULT NURSE NOTE - OBJECTIVE STATEMENT
35 year old female with PMH of Pre diabetes, Anxiety, Depression and High cholesterol. pt  fainted in her kitchen and struck her chin on the floor an hour  before arrival to ED. Pt with  c/o Left facial pan and laceration under the chin. Pt reports taking zepbound x few months for weightloss. Pt denies changes in vision, N/V

## 2025-05-11 NOTE — ED PROVIDER NOTE - PROGRESS NOTE DETAILS
NP Kaitlyn: Present in ER to repair patient's chin laceration.  Labs pertinent for D-dimer 334, otherwise nonactionable.  CT head without skull fracture ICH or jaw fracture.  All results discussed with patient and , will order CTA chest rule out PE 2/2 elevated D-dimer.  Disposition pending CTA results. NP Kaitlyn: CT max/face with Acute displaced and angulated left mandibular condyle fracture. Transfer center called, OMFS consulted placed, per conversation with OMFS will transfer ED-ED NP O'Laya: Present in ER to repair patient's chin laceration.  Labs pertinent for D-dimer 334, otherwise nonactionable.  CT head without skull fracture or ICH, CT max/face results pending.  All results discussed with patient and , will order CTA chest rule out PE 2/2 elevated D-dimer.  Disposition pending CTA results. YARON Sahni: CT max/face with Acute displaced and angulated left mandibular condyle fracture. Transfer center called, OMFS consulted placed, per conversation with OMFS will transfer ED-ED to [facility] for OMFS consult under accepting physician Dr Burton Jolley. Patient/ aware of and in agreement with plan of care. CTA chest pending.

## 2025-05-11 NOTE — ED ADULT TRIAGE NOTE - CHIEF COMPLAINT QUOTE
pt  fainted in her kitchen and struck her head on the floor a hour  ago. c/o l facial pan and laceration under the chin. also  c/o headache. history of  anxiety, depression and high cholesterol. takes zepbound for weigh loss. fs 98

## 2025-05-11 NOTE — ED PROVIDER NOTE - CLINICAL SUMMARY MEDICAL DECISION MAKING FREE TEXT BOX
NP Kaitlyn: Patient is 35-year-old female PMH anxiety, depression, hyperlipidemia, on something for weight loss presents with complaint of lightheadedness with syncope after going from sitting to standing kitchen today, positive head strike, patient sustained laceration under chin.  Patient states she sometimes has episodes of lightheadedness when she goes from sitting to standing, and only grabs onto something and is able to overcome the lightheadedness.  Patient denies CP, SOB, neck or back pain, persistent double or blurred vision, nausea or vomiting, numbness or weakness of the arms or legs.  Patient unsure of last tetanus.  Patient is nontoxic-appearing.  PE as above pertinent for no focal neurologic deficits left lateral scalp tender to palpation without hematomas lacerations or crepitus.  Left jaw TTP, tooth #1 chip no focal neurologic deficits.  No C/T/L spinal pain.  2cm linear laceration under chin, no active bleeding.  DDx concerning for but not limited to orthostatic hypotension vs vs electrolyte imbalance. Less concern for ACS in the absence of CP, ekg is nonischemic, No ISABEL/STD/TWI. Less concern for PE but cannot PERC patient out. Less concern for ICH vs jaw fracture.   will order labs and CT head/max face, and reassess. If ddimer is positive will order CTA chest r/o PE.    Plastics Dr Sasson called 2/2 patient requesting plastics for laceration repair, Dr Bullock will come to ER to repair laceration.   disposition pending results and reassessment.

## 2025-05-11 NOTE — ED PROVIDER NOTE - PHYSICAL EXAMINATION
CONSTITUTIONAL: A&O x3, NAD, resting comfortably, well groomed  HEAD: Normocephalic, left lateral head TTP, no hematoma, laceration, crepitus. + left jaw pain. tooth 1 chipped, no visualized pulp, tooth is intact in socket, not mobile.   EYES: clear sclera and conjunctiva, PERRL, EOM intact. No nystagmus.   NECK:  Airway parent. Neck Supple.   CARDIAC: RRR, normal S1/2, no murmurs, rubs, gallops. CW non-TTP, no CW deformity.  RESPIRATORY: CTA B/L, good aeration. No wheezing, rales, adventitious breath sounds. No accessory muscle use.    ABDOMEN: soft, non-distended; non-TTP,  No RUQ/RLQ/LUQ/LLQ pain, no rebound tenderness or guarding, BS + x4 quadrants, no pulsating masses, scars. No CVA tenderness.   PERIPHERAL VASCULAR: No edema of feet and ankles. No calf tenderness. Pulses 2+ B/L.   MSK: Moving all extremities. No C/T/L spinal or paraspinal pain.   SKIN:  2cm linear laceration under chin, no active bleeding. Capillary refill < 2 seconds.   NEURO: A&Ox3, interactive, cooperative, no focal deficits, CN II-XII grossly intact. Strength 5+/5+ upper & lower extremities. Sensation to light touch, pain, intact bilaterally to both upper and lower extremities. Normal gait. Romberg and pronator drift negative. Normal rapid alternating movements and point to point test. No paresthesias.

## 2025-05-11 NOTE — ED PROVIDER NOTE - CARE PLAN
1 Principal Discharge DX:	Syncope and collapse   Principal Discharge DX:	Syncope and collapse  Secondary Diagnosis:	Mandibular fracture

## 2025-05-11 NOTE — ED ADULT NURSE NOTE - NSFALLRISKINTERV_ED_ALL_ED
Assistance OOB with selected safe patient handling equipment if applicable/Communicate fall risk and risk factors to all staff, patient, and family/Orthostatic vital signs/Provide visual cue: yellow wristband, yellow gown, etc/Reinforce activity limits and safety measures with patient and family/Call bell, personal items and telephone in reach/Instruct patient to call for assistance before getting out of bed/chair/stretcher/Non-slip footwear applied when patient is off stretcher/[place] to call system/Physically safe environment - no spills, clutter or unnecessary equipment/Purposeful Proactive Rounding/Room/bathroom lighting operational, light cord in reach

## 2025-05-11 NOTE — ED ADULT TRIAGE NOTE - CHIEF COMPLAINT QUOTE
transferred from [place] for OMFS for left mandibular fx. Pt had a syncope episode and fell on her face. No intracranial bleeding, No blood thinner. hx of depression, anxiety, HLD. EKG from [place] in chart.

## 2025-05-11 NOTE — ED ADULT TRIAGE NOTE - STATUS:
ANTICOAGULATION MANAGEMENT     Prem Taylor 69 year old male is on warfarin with therapeutic INR result. (Goal INR 2.0-3.0)    Recent labs: (last 7 days)     06/21/22  0722   INR 2.6*       ASSESSMENT       Source(s): Chart Review, Patient/Caregiver Call and Template       Warfarin doses taken: Warfarin taken as instructed    Diet: No new diet changes identified    New illness, injury, or hospitalization: No    Medication/supplement changes: None noted    Signs or symptoms of bleeding or clotting: No    Previous INR: Therapeutic last 2(+) visits    Additional findings: None       PLAN     Recommended plan for no diet, medication or health factor changes affecting INR     Dosing Instructions: continue your current warfarin dose with next INR in 8 weeks       Summary  As of 6/21/2022    Full warfarin instructions:  7.5 mg every day   Next INR check:  8/16/2022             Telephone call with Ryan who verbalizes understanding and agrees to plan    Lab visit scheduled    Education provided: Please call back if any changes to your diet, medications or how you've been taking warfarin and Importance of therapeutic range    Plan made per [facility] anticoagulation protocol    Mee Maguire RN  Anticoagulation Clinic  6/21/2022    _______________________________________________________________________     Anticoagulation Episode Summary     Current INR goal:  2.0-3.0   TTR:  100.0 % (1 y)   Target end date:  Indefinite   Send INR reminders to:  RUBY MAURICIO    Indications    Pulmonary embolism  bilateral (H) (Resolved) [I26.99]  Persistent atrial fibrillation (H) [I48.19]           Comments:           Anticoagulation Care Providers     Provider Role Specialty Phone number    Mamadou Baez MD Referring Family Medicine [phone]             Intact

## 2025-05-12 ENCOUNTER — RESULT REVIEW (OUTPATIENT)
Age: 35
End: 2025-05-12

## 2025-05-12 DIAGNOSIS — Z90.89 ACQUIRED ABSENCE OF OTHER ORGANS: Chronic | ICD-10-CM

## 2025-05-12 DIAGNOSIS — S02.609A FRACTURE OF MANDIBLE, UNSPECIFIED, INITIAL ENCOUNTER FOR CLOSED FRACTURE: ICD-10-CM

## 2025-05-12 DIAGNOSIS — Z98.891 HISTORY OF UTERINE SCAR FROM PREVIOUS SURGERY: Chronic | ICD-10-CM

## 2025-05-12 LAB
ALBUMIN SERPL ELPH-MCNC: 3.8 G/DL — SIGNIFICANT CHANGE UP (ref 3.3–5)
ALP SERPL-CCNC: 66 U/L — SIGNIFICANT CHANGE UP (ref 40–120)
ALT FLD-CCNC: 55 U/L — HIGH (ref 4–33)
ANION GAP SERPL CALC-SCNC: 12 MMOL/L — SIGNIFICANT CHANGE UP (ref 7–14)
ANISOCYTOSIS BLD QL: SLIGHT — SIGNIFICANT CHANGE UP
APTT BLD: 28.6 SEC — SIGNIFICANT CHANGE UP (ref 26.1–36.8)
AST SERPL-CCNC: 29 U/L — SIGNIFICANT CHANGE UP (ref 4–32)
BASOPHILS # BLD AUTO: 0 K/UL — SIGNIFICANT CHANGE UP (ref 0–0.2)
BASOPHILS NFR BLD AUTO: 0 % — SIGNIFICANT CHANGE UP (ref 0–2)
BILIRUB SERPL-MCNC: 0.5 MG/DL — SIGNIFICANT CHANGE UP (ref 0.2–1.2)
BLD GP AB SCN SERPL QL: NEGATIVE — SIGNIFICANT CHANGE UP
BUN SERPL-MCNC: 9 MG/DL — SIGNIFICANT CHANGE UP (ref 7–23)
CALCIUM SERPL-MCNC: 8.5 MG/DL — SIGNIFICANT CHANGE UP (ref 8.4–10.5)
CHLORIDE SERPL-SCNC: 102 MMOL/L — SIGNIFICANT CHANGE UP (ref 98–107)
CO2 SERPL-SCNC: 23 MMOL/L — SIGNIFICANT CHANGE UP (ref 22–31)
CREAT SERPL-MCNC: 0.78 MG/DL — SIGNIFICANT CHANGE UP (ref 0.5–1.3)
EGFR: 101 ML/MIN/1.73M2 — SIGNIFICANT CHANGE UP
EGFR: 101 ML/MIN/1.73M2 — SIGNIFICANT CHANGE UP
EOSINOPHIL # BLD AUTO: 0.2 K/UL — SIGNIFICANT CHANGE UP (ref 0–0.5)
EOSINOPHIL NFR BLD AUTO: 6.1 % — HIGH (ref 0–6)
GIANT PLATELETS BLD QL SMEAR: PRESENT — SIGNIFICANT CHANGE UP
GLUCOSE BLDC GLUCOMTR-MCNC: 78 MG/DL — SIGNIFICANT CHANGE UP (ref 70–99)
GLUCOSE BLDC GLUCOMTR-MCNC: 98 MG/DL — SIGNIFICANT CHANGE UP (ref 70–99)
GLUCOSE SERPL-MCNC: 82 MG/DL — SIGNIFICANT CHANGE UP (ref 70–99)
HCG SERPL-ACNC: <1 MIU/ML — SIGNIFICANT CHANGE UP
HCT VFR BLD CALC: 37.8 % — SIGNIFICANT CHANGE UP (ref 34.5–45)
HGB BLD-MCNC: 12.6 G/DL — SIGNIFICANT CHANGE UP (ref 11.5–15.5)
HYPOCHROMIA BLD QL: SLIGHT — SIGNIFICANT CHANGE UP
IANC: 1.29 K/UL — LOW (ref 1.8–7.4)
INR BLD: 0.99 RATIO — SIGNIFICANT CHANGE UP (ref 0.85–1.16)
LYMPHOCYTES # BLD AUTO: 0.91 K/UL — LOW (ref 1–3.3)
LYMPHOCYTES # BLD AUTO: 27.2 % — SIGNIFICANT CHANGE UP (ref 13–44)
MAGNESIUM SERPL-MCNC: 2 MG/DL — SIGNIFICANT CHANGE UP (ref 1.6–2.6)
MCHC RBC-ENTMCNC: 28.8 PG — SIGNIFICANT CHANGE UP (ref 27–34)
MCHC RBC-ENTMCNC: 33.3 G/DL — SIGNIFICANT CHANGE UP (ref 32–36)
MCV RBC AUTO: 86.3 FL — SIGNIFICANT CHANGE UP (ref 80–100)
MONOCYTES # BLD AUTO: 0.44 K/UL — SIGNIFICANT CHANGE UP (ref 0–0.9)
MONOCYTES NFR BLD AUTO: 13.2 % — SIGNIFICANT CHANGE UP (ref 2–14)
NEUTROPHILS # BLD AUTO: 1.49 K/UL — LOW (ref 1.8–7.4)
NEUTROPHILS NFR BLD AUTO: 43 % — SIGNIFICANT CHANGE UP (ref 43–77)
NEUTS BAND # BLD: 1.7 % — SIGNIFICANT CHANGE UP (ref 0–6)
NEUTS BAND NFR BLD: 1.7 % — SIGNIFICANT CHANGE UP (ref 0–6)
PLAT MORPH BLD: NORMAL — SIGNIFICANT CHANGE UP
PLATELET # BLD AUTO: 166 K/UL — SIGNIFICANT CHANGE UP (ref 150–400)
PLATELET COUNT - ESTIMATE: NORMAL — SIGNIFICANT CHANGE UP
POTASSIUM SERPL-MCNC: 4.2 MMOL/L — SIGNIFICANT CHANGE UP (ref 3.5–5.3)
POTASSIUM SERPL-SCNC: 4.2 MMOL/L — SIGNIFICANT CHANGE UP (ref 3.5–5.3)
PROT SERPL-MCNC: 6.5 G/DL — SIGNIFICANT CHANGE UP (ref 6–8.3)
PROTHROM AB SERPL-ACNC: 11.5 SEC — SIGNIFICANT CHANGE UP (ref 9.9–13.4)
RBC # BLD: 4.38 M/UL — SIGNIFICANT CHANGE UP (ref 3.8–5.2)
RBC # FLD: 12 % — SIGNIFICANT CHANGE UP (ref 10.3–14.5)
RBC BLD AUTO: ABNORMAL
RH IG SCN BLD-IMP: POSITIVE — SIGNIFICANT CHANGE UP
SMUDGE CELLS # BLD: PRESENT — SIGNIFICANT CHANGE UP
SODIUM SERPL-SCNC: 137 MMOL/L — SIGNIFICANT CHANGE UP (ref 135–145)
VARIANT LYMPHS # BLD: 8.8 % — HIGH (ref 0–6)
VARIANT LYMPHS NFR BLD MANUAL: 8.8 % — HIGH (ref 0–6)
WBC # BLD: 3.34 K/UL — LOW (ref 3.8–10.5)
WBC # FLD AUTO: 3.34 K/UL — LOW (ref 3.8–10.5)

## 2025-05-12 PROCEDURE — 93306 TTE W/DOPPLER COMPLETE: CPT | Mod: 26

## 2025-05-12 PROCEDURE — 99223 1ST HOSP IP/OBS HIGH 75: CPT

## 2025-05-12 RX ORDER — CLONAZEPAM 0.5 MG/1
0.25 TABLET ORAL DAILY
Refills: 0 | Status: DISCONTINUED | OUTPATIENT
Start: 2025-05-12 | End: 2025-05-15

## 2025-05-12 RX ORDER — ARIPIPRAZOLE 2 MG/1
2 TABLET ORAL ONCE
Refills: 0 | Status: COMPLETED | OUTPATIENT
Start: 2025-05-12 | End: 2025-05-12

## 2025-05-12 RX ORDER — ARIPIPRAZOLE 2 MG/1
2 TABLET ORAL DAILY
Refills: 0 | Status: DISCONTINUED | OUTPATIENT
Start: 2025-05-12 | End: 2025-05-12

## 2025-05-12 RX ORDER — ARIPIPRAZOLE 2 MG/1
4 TABLET ORAL DAILY
Refills: 0 | Status: DISCONTINUED | OUTPATIENT
Start: 2025-05-13 | End: 2025-05-15

## 2025-05-12 RX ORDER — ROSUVASTATIN CALCIUM 20 MG/1
20 TABLET, FILM COATED ORAL AT BEDTIME
Refills: 0 | Status: DISCONTINUED | OUTPATIENT
Start: 2025-05-12 | End: 2025-05-12

## 2025-05-12 RX ORDER — ACETAMINOPHEN 500 MG/5ML
1000 LIQUID (ML) ORAL ONCE
Refills: 0 | Status: COMPLETED | OUTPATIENT
Start: 2025-05-12 | End: 2025-05-12

## 2025-05-12 RX ORDER — ONDANSETRON HCL/PF 4 MG/2 ML
4 VIAL (ML) INJECTION EVERY 8 HOURS
Refills: 0 | Status: DISCONTINUED | OUTPATIENT
Start: 2025-05-12 | End: 2025-05-15

## 2025-05-12 RX ORDER — ROSUVASTATIN CALCIUM 20 MG/1
5 TABLET, FILM COATED ORAL AT BEDTIME
Refills: 0 | Status: DISCONTINUED | OUTPATIENT
Start: 2025-05-12 | End: 2025-05-15

## 2025-05-12 RX ORDER — ESCITALOPRAM OXALATE 20 MG/1
20 TABLET ORAL ONCE
Refills: 0 | Status: COMPLETED | OUTPATIENT
Start: 2025-05-12 | End: 2025-05-12

## 2025-05-12 RX ORDER — OXYCODONE HYDROCHLORIDE 30 MG/1
5 TABLET ORAL EVERY 4 HOURS
Refills: 0 | Status: DISCONTINUED | OUTPATIENT
Start: 2025-05-12 | End: 2025-05-15

## 2025-05-12 RX ORDER — IBUPROFEN 200 MG
600 TABLET ORAL EVERY 6 HOURS
Refills: 0 | Status: DISCONTINUED | OUTPATIENT
Start: 2025-05-12 | End: 2025-05-15

## 2025-05-12 RX ORDER — AMPICILLIN SODIUM AND SULBACTAM SODIUM 1; .5 G/1; G/1
3 INJECTION, POWDER, FOR SOLUTION INTRAMUSCULAR; INTRAVENOUS EVERY 6 HOURS
Refills: 0 | Status: DISCONTINUED | OUTPATIENT
Start: 2025-05-12 | End: 2025-05-13

## 2025-05-12 RX ORDER — ROSUVASTATIN CALCIUM 20 MG/1
5 TABLET, FILM COATED ORAL ONCE
Refills: 0 | Status: COMPLETED | OUTPATIENT
Start: 2025-05-12 | End: 2025-05-12

## 2025-05-12 RX ORDER — ACETAMINOPHEN 500 MG/5ML
650 LIQUID (ML) ORAL EVERY 6 HOURS
Refills: 0 | Status: DISCONTINUED | OUTPATIENT
Start: 2025-05-12 | End: 2025-05-15

## 2025-05-12 RX ORDER — SODIUM CHLORIDE 9 G/1000ML
1000 INJECTION, SOLUTION INTRAVENOUS
Refills: 0 | Status: DISCONTINUED | OUTPATIENT
Start: 2025-05-12 | End: 2025-05-15

## 2025-05-12 RX ORDER — ESCITALOPRAM OXALATE 20 MG/1
20 TABLET ORAL DAILY
Refills: 0 | Status: DISCONTINUED | OUTPATIENT
Start: 2025-05-12 | End: 2025-05-15

## 2025-05-12 RX ORDER — OXYCODONE HYDROCHLORIDE 30 MG/1
10 TABLET ORAL EVERY 4 HOURS
Refills: 0 | Status: DISCONTINUED | OUTPATIENT
Start: 2025-05-12 | End: 2025-05-15

## 2025-05-12 RX ADMIN — Medication 400 MILLIGRAM(S): at 01:05

## 2025-05-12 RX ADMIN — ARIPIPRAZOLE 2 MILLIGRAM(S): 2 TABLET ORAL at 08:10

## 2025-05-12 RX ADMIN — Medication 650 MILLIGRAM(S): at 12:32

## 2025-05-12 RX ADMIN — Medication 600 MILLIGRAM(S): at 17:49

## 2025-05-12 RX ADMIN — ROSUVASTATIN CALCIUM 5 MILLIGRAM(S): 20 TABLET, FILM COATED ORAL at 21:54

## 2025-05-12 RX ADMIN — SODIUM CHLORIDE 100 MILLILITER(S): 9 INJECTION, SOLUTION INTRAVENOUS at 10:23

## 2025-05-12 RX ADMIN — AMPICILLIN SODIUM AND SULBACTAM SODIUM 200 GRAM(S): 1; .5 INJECTION, POWDER, FOR SOLUTION INTRAMUSCULAR; INTRAVENOUS at 09:32

## 2025-05-12 RX ADMIN — AMPICILLIN SODIUM AND SULBACTAM SODIUM 200 GRAM(S): 1; .5 INJECTION, POWDER, FOR SOLUTION INTRAMUSCULAR; INTRAVENOUS at 21:54

## 2025-05-12 RX ADMIN — ESCITALOPRAM OXALATE 20 MILLIGRAM(S): 20 TABLET ORAL at 07:54

## 2025-05-12 RX ADMIN — AMPICILLIN SODIUM AND SULBACTAM SODIUM 200 GRAM(S): 1; .5 INJECTION, POWDER, FOR SOLUTION INTRAMUSCULAR; INTRAVENOUS at 13:46

## 2025-05-12 RX ADMIN — ARIPIPRAZOLE 2 MILLIGRAM(S): 2 TABLET ORAL at 17:49

## 2025-05-12 RX ADMIN — ROSUVASTATIN CALCIUM 5 MILLIGRAM(S): 20 TABLET, FILM COATED ORAL at 07:55

## 2025-05-12 RX ADMIN — Medication 650 MILLIGRAM(S): at 12:02

## 2025-05-12 RX ADMIN — Medication 15 MILLILITER(S): at 17:49

## 2025-05-12 RX ADMIN — Medication 600 MILLIGRAM(S): at 18:19

## 2025-05-12 NOTE — CONSULT NOTE ADULT - ASSESSMENT
35-year-old female, history of anxiety and depression on Klonopin and Abilify, hyperlipidemia on rosuvastatin, presents as a transfer from [place] for OMFS evaluation.  Patient reports that earlier today she got up too fast from the couch and syncopized.  She woke up on the ground with a chin laceration.  Went to [facility] and received 20 stitches and a CT max face which showed an acute displaced and angulated left mandibular condyle fracture. Transferred ED-ED to [facility] for OMFS consult. Patient currently denies any fever, chest pain, dizziness, shortness of breath, abdominal pain, nausea or vomiting.  Is still in significant pain in her left jaw.  Denies any prior history of syncope.  Not on any blood thinners. Consult iso of initial syncope history. Noted to have had LOC while standing from couch. Denies decreased OP intakes, prior episodes, cardiac history, or aura prior to syncope.     #Syncope  -Differential: orthostatic vs vasovagal vs cardiogenic  > order orthostatics check   > order maintenance fluids  > cardiac w/u: EKG, TTE  > transfer to tele  > consideration for compression stockings upon discharge 35F, history of anxiety and depression on Klonopin and Abilify, hyperlipidemia on rosuvastatin, Zepbound (for [facility]) presents as a transfer from [place] for OMFS evaluation.  Patient reports that earlier today she got up too fast from the couch and syncopized.  She woke up on the ground with a chin laceration.  Went to [facility] and received 20 stitches and a CT max face which showed an acute displaced and angulated left mandibular condyle fracture. Transferred ED-ED to [facility] for OMFS consult. Patient currently denies any fever, chest pain, dizziness, shortness of breath, abdominal pain, nausea or vomiting.  Is still in significant pain in her left jaw.  Denies any prior history of syncope.  Not on any blood thinners. Consult iso of initial syncope history. Noted to have had LOC while standing from couch. Endorsed decreased PO intake. States day of incident, had taken klonopin in AM; a couple hrs later, noted to have gotten off couch, experienced a moment of warmth and passed out. States she has had recurrent episodes of dizziness and warmth, but this is her first episode of LOC. States no prior cardiac history.     #Syncope  -Differential: orthostatic vs vasovagal vs cardiogenic  > order orthostatics check   > order maintenance fluids  > cardiac w/u: EKG, TTE  > transfer to tele  > consideration for compression stockings upon discharge 35F, history of anxiety and depression on Klonopin and Abilify, hyperlipidemia on rosuvastatin, Zepbound (for [facility]) presents as a transfer from [place] for OMFS evaluation.  Patient reports that earlier today she got up too fast from the couch and syncopized.  She woke up on the ground with a chin laceration.  Went to [facility] and received 20 stitches and a CT max face which showed an acute displaced and angulated left mandibular condyle fracture. Transferred ED-ED to [facility] for OMFS consult. Patient currently denies any fever, chest pain, dizziness, shortness of breath, abdominal pain, nausea or vomiting.  Is still in significant pain in her left jaw.  Denies any prior history of syncope.  Not on any blood thinners. Consult iso of initial syncope history. Noted to have had LOC while standing from couch. Endorsed decreased PO intake. States day of incident, had taken klonopin in AM; a couple hrs later, noted to have gotten off couch, experienced a moment of warmth and passed out. States she has had recurrent episodes of dizziness and warmth, but this is her first episode of LOC. States no prior cardiac history.     #Syncope  -Differential: orthostatic vs vasovagal vs cardiogenic  > order orthostatics check   > order maintenance fluids  > cardiac w/u: EKG, TTE  > transfer to tele  > consideration for compression stockings upon discharge    #Med Rec  -on klonopin, abilify (4 mg), rosuvastatin  > c/w daily klonopin to avoid withdrawal  > increase abilify to 4 mg

## 2025-05-12 NOTE — H&P ADULT - HISTORY OF PRESENT ILLNESS
35-year-old female, history of anxiety and depression on Klonopin and Abilify, hyperlipidemia on rosuvastatin, presents as a transfer from [place] for OMFS evaluation.  Patient reports that earlier today she got up too fast from the couch and syncopized.  She woke up on the ground with a chin laceration.  Went to [facility] and received 20 stitches and a CT max face which showed an acute displaced and angulated left mandibular condyle fracture. Transferred ED-ED to [facility] for OMFS consult. Patient currently denies any fever, chest pain, dizziness, shortness of breath, abdominal pain, nausea or vomiting.  Is still in significant pain in her left jaw.  Denies any prior history of syncope.  Not on any blood thinners.

## 2025-05-12 NOTE — PATIENT PROFILE ADULT - FUNCTIONAL SCREEN CURRENT LEVEL: COMMUNICATION, MLM
Price (Do Not Change): 0.00
Detail Level: Simple
Instructions: This plan will send the code FBSD to the PM system.  DO NOT or CHANGE the price.
0 = understands/communicates without difficulty

## 2025-05-12 NOTE — ED ADULT NURSE NOTE - NSFALLRISKINTERV_ED_ALL_ED

## 2025-05-12 NOTE — H&P ADULT - NSHPLABSRESULTS_GEN_ALL_CORE
IMAGING:  L displaced mandibular condylar fx    Vital Signs Last 24 Hrs  T(C): 36.4 (12 May 2025 07:31), Max: 36.7 (11 May 2025 23:49)  T(F): 97.6 (12 May 2025 07:31), Max: 98 (11 May 2025 23:49)  HR: 61 (12 May 2025 07:31) (61 - 75)  BP: 100/68 (12 May 2025 07:31) (100/68 - 109/67)  BP(mean): --  RR: 18 (12 May 2025 07:31) (17 - 18)  SpO2: 100% (12 May 2025 07:31) (98% - 100%)    Parameters below as of 12 May 2025 07:31  Patient On (Oxygen Delivery Method): room air

## 2025-05-12 NOTE — ED ADULT NURSE REASSESSMENT NOTE - NS ED NURSE REASSESS COMMENT FT1
Patient received from Break coverage RN. Transfer from [place] for OMFS, pending consult scheduled for AM. Patient currently resting comfortably in bed, reported pain of 5/10 to jaw, medicated with tylenol. Labwork with held as per order until consult for surgery completed. well appearing with no acute distress noted. plan of care ongoing Patient received from Break coverage RN. Transfer from [place] for OMFS, pending consult scheduled for AM. Patient currently resting comfortably in bed, reported pain of 5/10 to jaw, medicated with tylenol. Lab work with held as per order until consult for surgery completed. well appearing with no acute distress noted, found to have a fractured mandible from a syncopal episode, reports that she was getting up from the couch to cook dinner and felt light headed and then fell on her face. Patient has a laceration that is taped to the bottom of her chin but is not actively bleeding. able to speak in full sentences with no labored breathing or neuro deficits noted. Plan of care ongoing.

## 2025-05-12 NOTE — CONSULT NOTE ADULT - SUBJECTIVE AND OBJECTIVE BOX
OMFS Consult Note:    EFRA SHOEMAKER  MRN-[id number]  [facility] ED.    Patient is a 35y old  Female who presents with a chief complaint of mandible fracture       HPI:  35-year-old female, history of anxiety and depression on Klonopin and Abilify, hyperlipidemia on rosuvastatin, presents as a transfer from [place] for OMFS evaluation.  Patient reports that earlier today she got up too fast from the couch and syncopized.  She woke up on the ground with a chin laceration.  Went to [facility] and received 20 stitches and a CT max face which showed an acute displaced and angulated left mandibular condyle fracture. Transferred ED-ED to [facility] for OMFS consult. Patient currently denies any fever, chest pain, dizziness, shortness of breath, abdominal pain, nausea or vomiting.  Is still in significant pain in her left jaw.  Denies any prior history of syncope.  Not on any blood thinners.    PAST MEDICAL & SURGICAL HISTORY:        Home Medications:        Allergies    No Known Allergies    Intolerances            *SOCIAL HISTORY: Denies ETOH use, Tobacco, drugs    Review of systems:  denies fever, chills. denies LOC. denies nausea/vomiting/headache. denies CP, SOB, cough. Denies palpatitions. denies blurred/double vision. denies dyspahgia, dyspnea.      T(F): 98 (05-11-25 @ 23:49), Max: 98 (05-11-25 @ 23:49)  HR: 70 (05-11-25 @ 23:49) (70 - 70)  BP: 109/67 (05-11-25 @ 23:49) (109/67 - 109/67)  RR: 18 (05-11-25 @ 23:49) (18 - 18)  SpO2: 98% (05-11-25 @ 23:49) (98% - 98%)      MEDICATIONS  (STANDING):    MEDICATIONS  (PRN):      PHYSICAL EXAM:    Gen: AAox3, NAD, NC/AT  Head: no Lacerations/abrasions/hematomas. no edema/erythema/tenderness to palpation. no asymmetry. no signs of scalp infection  Eyes: EOMI, PERRL, visual acuity intact, no diplopia,  supra/infra orbital rims intact, no subconjunctival heme, no telecanthus, no exophthalmos  Ears: Gross hearing intact, No heme appreciated, Condylar head palpated  Nose: no crepitis/septal hematoma/asymmetry.  no Lacerations/abrasions/hematomas.  Malar: no malar depression, no CN V-2 paresthesia  Throat: no LAD, supple, FROM, repaired chin lac hemostatic      Extraoral/Intraoral Exam: TTP over L periauricular area, JAIRO: ~30mm, pain on opening, Dentition grossly intact, occlusion stable and reproducible, no lacerations/abrasions/hematomas, no mandibular step deformity, no CN V-3 paresthesia, no signs of infection, no edema/erythema/tenderness to palpation. FOM soft, NT. no mobility of maxilla/crepitis. TMJ: no clicking/popping  CN II-XII intact    LABS:        IMAGING:  L displaced mandibular condylar fx

## 2025-05-12 NOTE — ED ADULT NURSE NOTE - OBJECTIVE STATEMENT
patient received to room 23, A&Ox4, english speaking, ambulatory. coming ed as transfer from [facility]. patient states got dizzy lightheadedness and had fall yesterday fell on face, as per [place] patient has fractured mandible, and lac to chin. patient states 5/10 pain, has IV 20G to L ac, from [place], flushes without difficulty,  patient points to L side of mandible for pain, speaking, airway patent, no cardiac history. unknown cause of fall. medicated as ordered. breathing even and unlabored in bed. Pt denies chest pain, SOB, headache, dizziness, blurry vision, N/V and chills at this time. Pt endorses no complaints currently. Bed in lowest position, call bell within reach. All safety precautions in place.

## 2025-05-12 NOTE — CONSULT NOTE ADULT - ATTENDING COMMENTS
35 year old female with past medical hx of depression, anxiety, Gestational DM and HLD presented to the ED from  after a syncopal episode causing chin strike. She states she woke up on the ground and did not know how long she was down. She states right before passing out she felt warmth and dizziness and ringing in her ears when she woke up. She denies any loss of urination, chest pain, palpitations, vision changes, headache, sob, n/v/d/c/abdominal pain. She states she recently started taking a new medication Zepbound in february for weight loss which she thinks is the cause of her syncopal episode, last dose on Wednesday. She also states she takes abilify 4, lexapro 20, trilomarsia birth control pill daily, klonopin 0.25 daily, and rosuvastatin 20 - confirmed with pharmacy.    Pt denies any prior history of syncope. She states her appetite has been decreased for the past few months since starting her weight loss medication. She recently had a baby 10 months ago and is currently not breast feeding. She states she doesn't exercise regularly but denies difficulty with climbing stairs. She is not aware of any family history of heart problems in her parents but states her grandmother had a triple bypass.     On exam, possible fixed splitting of heart sounds noted, otherwise, no edema, lungs ctab    # Syncopal Episode:   - f/u orthostatics  - EKG pending  - f/u TTE, consider tele monitoring    # HLD: continue statin  # Anxiety & Depression: continue abilify 4 and lexapro 20, START klonopin 0.25 daily in am to avoid withdrawal (chronic med since 8/24)    Rest as above 35 year old female with past medical hx of depression, anxiety, Gestational DM and HLD presented to the ED from  after a syncopal episode causing chin strike. She states she woke up on the ground and did not know how long she was down. She states right before passing out she felt warmth and dizziness and ringing in her ears when she woke up. She denies any loss of urination, chest pain, palpitations, vision changes, headache, sob, n/v/d/c/abdominal pain. She states she recently started taking a new medication Zepbound in february for weight loss which she thinks is the cause of her syncopal episode, last dose on Wednesday. She also states she takes abilify 4, lexapro 20, trilomarsia birth control pill daily, klonopin 0.25 daily, and rosuvastatin 20 - confirmed with pharmacy.    Pt denies any prior history of syncope. She states her appetite has been decreased for the past few months since starting her weight loss medication. She recently had a baby 10 months ago and is currently not breast feeding. She states she doesn't exercise regularly but denies difficulty with climbing stairs. She is not aware of any family history of heart problems in her parents but states her grandmother had a triple bypass.     On exam, possible fixed splitting of heart sounds noted, otherwise, no edema, lungs ctab    # Syncopal Episode:   - f/u orthostatics  - EKG pending  - f/u TTE, consider tele monitoring    # HLD: continue statin  # Anxiety & Depression: continue abilify 4 and lexapro 20, START klonopin 0.25 daily in am to avoid withdrawal (chronic med since 8/24 - confirmed on ISTOP)    Rest as above 35 year old female with past medical hx of depression, anxiety, Gestational DM and HLD presented to the ED from  after a syncopal episode causing chin strike. She states she woke up on the ground and did not know how long she was down. She states right before passing out she felt warmth and dizziness and ringing in her ears when she woke up. She denies any loss of urination, chest pain, palpitations, vision changes, headache, sob, n/v/d/c/abdominal pain. She states she recently started taking a new medication Zepbound in february for weight loss which she thinks is the cause of her syncopal episode, last dose on Wednesday. She also states she takes abilify 4, lexapro 20, trilomarsia birth control pill daily, klonopin 0.25 daily, and rosuvastatin 20 - confirmed with pharmacy.    Pt denies any prior history of syncope. She states her appetite has been decreased for the past few months since starting her weight loss medication. She recently had a baby 10 months ago and is currently not breast feeding. She states she doesn't exercise regularly but denies difficulty with climbing stairs. She is not aware of any family history of heart problems in her parents but states her grandmother had a triple bypass.     On exam, possible fixed splitting of heart sounds noted, otherwise, no edema, lungs ctab    # Syncopal Episode:   - f/u orthostatics  - EKG at [place] stream: NSR, qtc 440, no ischemic findings  - f/u TTE, consider tele monitoring    # HLD: continue statin  # Anxiety & Depression: continue abilify 4 and lexapro 20, START klonopin 0.25 daily in am to avoid withdrawal (chronic med since 8/24 - confirmed on ISTOP)    Rest as above 35 year old female with past medical hx of depression, anxiety, Gestational DM and HLD presented to the ED from  after a syncopal episode causing chin strike. She states she woke up on the ground and did not know how long she was down. She states right before passing out she felt warmth and dizziness and ringing in her ears when she woke up. She denies any loss of urination, chest pain, palpitations, vision changes, headache, sob, n/v/d/c/abdominal pain. She states she recently started taking a new medication Zepbound in february for weight loss which she thinks is the cause of her syncopal episode, last dose on Wednesday. She also states she takes abilify 4, lexapro 20, trilomarsia birth control pill daily, klonopin 0.25 daily, and rosuvastatin 5 - confirmed with pharmacy.    Pt denies any prior history of syncope. She states her appetite has been decreased for the past few months since starting her weight loss medication. She recently had a baby 10 months ago and is currently not breast feeding. She states she doesn't exercise regularly but denies difficulty with climbing stairs. She is not aware of any family history of heart problems in her parents but states her grandmother had a triple bypass.     On exam, possible fixed splitting of heart sounds noted, otherwise, no edema, lungs ctab    # Syncopal Episode:   - f/u orthostatics  - EKG at [place] stream: NSR, qtc 440, no ischemic findings  - f/u TTE, consider tele monitoring    # HLD: continue statin  # Anxiety & Depression: continue abilify 4 and lexapro 20, START klonopin 0.25 daily in am to avoid withdrawal (chronic med since 8/24 - confirmed on ISTOP)    Rest as above

## 2025-05-12 NOTE — H&P ADULT - ASSESSMENT
35F, PMHx of anxiety, depression, HLD, synopsized today and fell on her face, transferred from  for L displaced mandibular condylar fx.    - Patient syncopize, medicine consult   - Patient to go to the OR possibly Tuesday or Wednesday   - Pre-op labs   - Multimodal pain medication   - FLD

## 2025-05-12 NOTE — ED ADULT NURSE REASSESSMENT NOTE - NS ED NURSE REASSESS COMMENT FT1
Patient resting comfortably in bed, no complaints at this time. OMFS consult complete, patient pending admission to hospital for surgery to fractured left mandible. Denies nausea, pain, dizziness, numbness or tingling, states that currently she is only experiencing some stiffness. Vitally stable, breathing even and unlabored, Plan of care ongoing

## 2025-05-12 NOTE — CONSULT NOTE ADULT - ASSESSMENT
Assessment/Plan: 35F, PMHx of anxiety, depression, HLD, synopsized today and fell on her face, transferred from  for L displaced mandibular condylar fx.    Pending  Assessment/Plan: 35F, PMHx of anxiety, depression, HLD, synopsized today and fell on her face, transferred from  for L displaced mandibular condylar fx.  - R/a AM rounds

## 2025-05-12 NOTE — CONSULT NOTE ADULT - SUBJECTIVE AND OBJECTIVE BOX
Sea Hamilton  PGY-2 Resident Physician     Patient is a 35y old  Female who presents with a chief complaint of Left displaced mandibular condylar fracture (12 May 2025 08:15)    C/S Reason: Syncope    HPI:   35-year-old female, history of anxiety and depression on Klonopin and Abilify, hyperlipidemia on rosuvastatin, presents as a transfer from [place] for OMFS evaluation.  Patient reports that earlier today she got up too fast from the couch and syncopized.  She woke up on the ground with a chin laceration.  Went to [facility] and received 20 stitches and a CT max face which showed an acute displaced and angulated left mandibular condyle fracture. Transferred ED-ED to [facility] for OMFS consult. Patient currently denies any fever, chest pain, dizziness, shortness of breath, abdominal pain, nausea or vomiting.  Is still in significant pain in her left jaw.  Denies any prior history of syncope.  Not on any blood thinners.    Consult iso of initial syncope history. Noted to have had LOC while standing from couch. Denies decreased OP intakes, prior episodes, cardiac history, or aura prior to syncope.     MEDICATIONS  (STANDING):  acetaminophen   Oral Liquid .. 650 milliGRAM(s) Oral every 6 hours  ampicillin/sulbactam  IVPB 3 Gram(s) IV Intermittent every 6 hours  ARIPiprazole 2 milliGRAM(s) Oral daily  chlorhexidine 0.12% Liquid 15 milliLiter(s) Oral Mucosa two times a day  escitalopram 20 milliGRAM(s) Oral daily  ibuprofen  Suspension. 600 milliGRAM(s) Oral every 6 hours  lactated ringers. 1000 milliLiter(s) (100 mL/Hr) IV Continuous <Continuous>  rosuvastatin 5 milliGRAM(s) Oral at bedtime    MEDICATIONS  (PRN):  ondansetron Injectable 4 milliGRAM(s) IV Push every 8 hours PRN Nausea and/or Vomiting  oxyCODONE    Solution 5 milliGRAM(s) Oral every 4 hours PRN Moderate Pain (4 - 6)  oxyCODONE    Solution 10 milliGRAM(s) Oral every 4 hours PRN Severe Pain (7 - 10)    Allergies    sulfa drugs (Other)    Intolerances        Vital Signs Last 24 Hrs  T(C): 36.5 (12 May 2025 11:02), Max: 36.8 (12 May 2025 09:23)  T(F): 97.7 (12 May 2025 11:02), Max: 98.3 (12 May 2025 09:23)  HR: 55 (12 May 2025 11:02) (55 - 77)  BP: 105/71 (12 May 2025 11:02) (94/67 - 109/67)  BP(mean): 77 (12 May 2025 09:23) (77 - 77)  RR: 15 (12 May 2025 11:02) (12 - 18)  SpO2: 100% (12 May 2025 11:02) (98% - 100%)    Parameters below as of 12 May 2025 11:02  Patient On (Oxygen Delivery Method): room air      Daily Height in cm: 160.02 (11 May 2025 23:49)    Daily   I&O's Summary      Physical Exam  GENERAL: NAD. Well-developed.  NEUROLOGICAL: A&O x 4. CN2-12. Strength, Sensation, ROM wnl. Brachial, ankle, babinski reflex wnl. Cerebellar signs (FTN) wnl. Gait appreciated.    HEAD: Noted trauma to madible  CARDIAC: RRR w/ normal S1 & S2. No murmurs, rubs. & gallops. Radial & dorsal pedis pulses intact. No carotid bruits.   PULMONARY: CTA b/l w/o accessory muscle use.   GI: Soft, NT, ND, no rebound, no guarding. NABS in 4 quads.   RENAL: No lower extremity edema. No CVA tenderness.   MSK/DERM:  Examination of the (head/neck/spine/ribs/pelvis, RUE, LUE, RLE, LLE) without misalignment.  Normal ROM without pain, no spinal tenderness, normal muscle strength/tone. No rashes or ulcers noted; no subcutaneous nodules or induration palpable  PSYCH: Appropriate insight/judgment    DIAGNOSTICS:                         12.6   3.34  )-----------( 166      ( 12 May 2025 08:50 )             37.8     Hgb Trend: 12.6<--  05-12    137  |  102  |  9   ----------------------------<  82  4.2   |  23  |  0.78    Ca    8.5      12 May 2025 08:50  Mg     2.00     05-12    TPro  6.5  /  Alb  3.8  /  TBili  0.5  /  DBili  x   /  AST  29  /  ALT  55[H]  /  AlkPhos  66  05-12    CAPILLARY BLOOD GLUCOSE      POCT Blood Glucose.: 78 mg/dL (12 May 2025 09:32)    Creatinine Trend: 0.78<--  LIVER FUNCTIONS - ( 12 May 2025 08:50 )  Alb: 3.8 g/dL / Pro: 6.5 g/dL / ALK PHOS: 66 U/L / ALT: 55 U/L / AST: 29 U/L / GGT: x           PT/INR - ( 12 May 2025 08:50 )   PT: 11.5 sec;   INR: 0.99 ratio         PTT - ( 12 May 2025 08:50 )  PTT:28.6 sec      Urinalysis Basic - ( 12 May 2025 08:50 )    Color: x / Appearance: x / SG: x / pH: x  Gluc: 82 mg/dL / Ketone: x  / Bili: x / Urobili: x   Blood: x / Protein: x / Nitrite: x   Leuk Esterase: x / RBC: x / WBC x   Sq Epi: x / Non Sq Epi: x / Bacteria: x           Sea Hamilton  PGY-2 Resident Physician     Patient is a 35y old  Female who presents with a chief complaint of Left displaced mandibular condylar fracture (12 May 2025 08:15)    C/S Reason: Syncope    HPI:   35-year-old female, history of anxiety and depression on Klonopin and Abilify, hyperlipidemia on rosuvastatin, Zepbound (for WL) presents as a transfer from [place] for OMFS evaluation.  Patient reports that earlier today she got up too fast from the couch and syncopized.  She woke up on the ground with a chin laceration.  Went to [facility] and received 20 stitches and a CT max face which showed an acute displaced and angulated left mandibular condyle fracture. Transferred ED-ED to [facility] for OMFS consult. Patient currently denies any fever, chest pain, dizziness, shortness of breath, abdominal pain, nausea or vomiting.  Is still in significant pain in her left jaw.  Denies any prior history of syncope.  Not on any blood thinners. Consult iso of initial syncope history. Noted to have had LOC while standing from couch. Endorsed decreased PO intake. States day of incident, had taken klonopin in AM; a couple hrs later, noted to have gotten off couch, experienced a moment of warmth and passed out. States she has had recurrent episodes of dizziness and warmth, but this is her first episode of LOC. States no prior cardiac history.     MEDICATIONS  (STANDING):  acetaminophen   Oral Liquid .. 650 milliGRAM(s) Oral every 6 hours  ampicillin/sulbactam  IVPB 3 Gram(s) IV Intermittent every 6 hours  ARIPiprazole 2 milliGRAM(s) Oral daily  chlorhexidine 0.12% Liquid 15 milliLiter(s) Oral Mucosa two times a day  escitalopram 20 milliGRAM(s) Oral daily  ibuprofen  Suspension. 600 milliGRAM(s) Oral every 6 hours  lactated ringers. 1000 milliLiter(s) (100 mL/Hr) IV Continuous <Continuous>  rosuvastatin 5 milliGRAM(s) Oral at bedtime    MEDICATIONS  (PRN):  ondansetron Injectable 4 milliGRAM(s) IV Push every 8 hours PRN Nausea and/or Vomiting  oxyCODONE    Solution 5 milliGRAM(s) Oral every 4 hours PRN Moderate Pain (4 - 6)  oxyCODONE    Solution 10 milliGRAM(s) Oral every 4 hours PRN Severe Pain (7 - 10)    Allergies    sulfa drugs (Other)    Intolerances        Vital Signs Last 24 Hrs  T(C): 36.5 (12 May 2025 11:02), Max: 36.8 (12 May 2025 09:23)  T(F): 97.7 (12 May 2025 11:02), Max: 98.3 (12 May 2025 09:23)  HR: 55 (12 May 2025 11:02) (55 - 77)  BP: 105/71 (12 May 2025 11:02) (94/67 - 109/67)  BP(mean): 77 (12 May 2025 09:23) (77 - 77)  RR: 15 (12 May 2025 11:02) (12 - 18)  SpO2: 100% (12 May 2025 11:02) (98% - 100%)    Parameters below as of 12 May 2025 11:02  Patient On (Oxygen Delivery Method): room air      Daily Height in cm: 160.02 (11 May 2025 23:49)    Daily   I&O's Summary      Physical Exam  GENERAL: NAD. Well-developed.  NEUROLOGICAL: A&O x 4. CN2-12. Strength, Sensation, ROM wnl. Brachial, ankle, babinski reflex wnl. Cerebellar signs (FTN) wnl. Gait appreciated.    HEAD: Noted trauma to madible  CARDIAC: RRR w/ normal S1 & S2. No murmurs, rubs. & gallops. Radial & dorsal pedis pulses intact. No carotid bruits.   PULMONARY: CTA b/l w/o accessory muscle use.   GI: Soft, NT, ND, no rebound, no guarding. NABS in 4 quads.   RENAL: No lower extremity edema. No CVA tenderness.   MSK/DERM:  Examination of the (head/neck/spine/ribs/pelvis, RUE, LUE, RLE, LLE) without misalignment.  Normal ROM without pain, no spinal tenderness, normal muscle strength/tone. No rashes or ulcers noted; no subcutaneous nodules or induration palpable  PSYCH: Appropriate insight/judgment    DIAGNOSTICS:                         12.6   3.34  )-----------( 166      ( 12 May 2025 08:50 )             37.8     Hgb Trend: 12.6<--  05-12    137  |  102  |  9   ----------------------------<  82  4.2   |  23  |  0.78    Ca    8.5      12 May 2025 08:50  Mg     2.00     05-12    TPro  6.5  /  Alb  3.8  /  TBili  0.5  /  DBili  x   /  AST  29  /  ALT  55[H]  /  AlkPhos  66  05-12    CAPILLARY BLOOD GLUCOSE      POCT Blood Glucose.: 78 mg/dL (12 May 2025 09:32)    Creatinine Trend: 0.78<--  LIVER FUNCTIONS - ( 12 May 2025 08:50 )  Alb: 3.8 g/dL / Pro: 6.5 g/dL / ALK PHOS: 66 U/L / ALT: 55 U/L / AST: 29 U/L / GGT: x           PT/INR - ( 12 May 2025 08:50 )   PT: 11.5 sec;   INR: 0.99 ratio         PTT - ( 12 May 2025 08:50 )  PTT:28.6 sec      Urinalysis Basic - ( 12 May 2025 08:50 )    Color: x / Appearance: x / SG: x / pH: x  Gluc: 82 mg/dL / Ketone: x  / Bili: x / Urobili: x   Blood: x / Protein: x / Nitrite: x   Leuk Esterase: x / RBC: x / WBC x   Sq Epi: x / Non Sq Epi: x / Bacteria: x

## 2025-05-12 NOTE — CONSULT NOTE ADULT - TIME BILLING
review of laboratory data, radiology results, consultants' recommendations, documentation in [place], discussion with patient and interdisciplinary staff (such as , social workers, etc). Interventions were performed as documented above.    This time excludes teaching and separately reported services.

## 2025-05-12 NOTE — ED PROVIDER NOTE - PROGRESS NOTE DETAILS
Dalila PGY3: OMFS paged. Dalila PGY3: OMFS consulted and will come evaluate pt Neetu LOPEZ: Patient in signout.  35 female transferred from [place] for left mandibular displaced condyle fracture, OMFS on board, tentative plan for surgical correction. OMFS to update final plan in note. Patient aware of plan. Neetu LOPEZ: OMFS called back, rec adm to Evelin Sommer.

## 2025-05-12 NOTE — ED PROVIDER NOTE - CLINICAL SUMMARY MEDICAL DECISION MAKING FREE TEXT BOX
35-year-old female, history of anxiety and depression on Klonopin and Abilify, hyperlipidemia on rosuvastatin, presents as a transfer from [place] for OMFS evaluation.  Patient reports that earlier today she got up too fast from the couch and syncopized.  She woke up on the ground with a chin laceration.  Went to [facility] and received 20 stitches and a CT max face which showed an acute displaced and angulated left mandibular condyle fracture. Transferred ED-ED to [facility] for OMFS consult under accepting physician Dr Burton Jolley. Patient currently denies any fever, chest pain, dizziness, shortness of breath, abdominal pain, nausea or vomiting.  Is still in significant pain in her left jaw.  Denies any prior history of syncope.  Not on any blood thinners.    [facility] MRN: [id number]    Vitals nonactionable    Well appearing, mentating appropriately, speaking, bandaged lower chin lac repair, heart rrr, lungs clear, abdomen soft, nontender    Transfer for jaw fracture, will obtain consult OMFS, rpt pre-op labs, pain control, dispo pending recs

## 2025-05-12 NOTE — ED PROVIDER NOTE - ATTENDING CONTRIBUTION TO CARE
36 y/o F with no significant PMH transferred from [facility] for OMFS evaluation of mandibular condyle fracture.  Pt had a syncopal episode after standing up from the couch earlier today.  Fell and landed on her chin, sustaining laceration that was repaired at OSH and found to have L mandibular condyle fx on CT.  Pt recalls standing from couch and feeling lightheaded prior to syncope.  No cp, palpitations, sob, recent illness.  Workup at [place] including EKG, labs, and CTA chest negative.    Well appearing, lying comfortably in stretcher, awake and alert, nontoxic.  AF/VSS.  NCAT EOMI PERRL, (+)TTP to L mandible, closed wound to chin.  No pedal edema or calf tenderness.  Moving all extremities, no deofrmities, pelvis stable, gait steady, no focal neuro deficits.    Cont pain control, NPO until OMFS evaluation for plan of care.

## 2025-05-12 NOTE — H&P ADULT - NSHPPHYSICALEXAM_GEN_ALL_CORE
Gen: AAox3, NAD, NC/AT  Head: no Lacerations/abrasions/hematomas. no edema/erythema/tenderness to palpation. no asymmetry. no signs of scalp infection  Eyes: EOMI, PERRL, visual acuity intact, no diplopia,  supra/infra orbital rims intact, no subconjunctival heme, no telecanthus, no exophthalmos  Ears: Gross hearing intact, No heme appreciated, Condylar head palpated  Nose: no crepitis/septal hematoma/asymmetry.  no Lacerations/abrasions/hematomas.  Malar: no malar depression, no CN V-2 paresthesia  Throat: no LAD, supple, FROM, repaired chin lac hemostatic      Extraoral/Intraoral Exam: TTP over L periauricular area, JAIRO: ~30mm, pain on opening, Dentition grossly intact, occlusion stable and reproducible, no lacerations/abrasions/hematomas, no mandibular step deformity, no CN V-3 paresthesia, no signs of infection, no edema/erythema/tenderness to palpation. FOM soft, NT. no mobility of maxilla/crepitis. TMJ: no clicking/popping  CN II-XII intact

## 2025-05-12 NOTE — ED ADULT NURSE NOTE - CHIEF COMPLAINT QUOTE
transferred from [place] for OMFS for left mandibular fx. Pt had a syncope episode and fell on her face. No intracranial bleeding, No blood thinner. hx of depression, anxiety, HLD. EKG from [place] in chart.

## 2025-05-13 DIAGNOSIS — Z01.818 ENCOUNTER FOR OTHER PREPROCEDURAL EXAMINATION: ICD-10-CM

## 2025-05-13 DIAGNOSIS — R55 SYNCOPE AND COLLAPSE: ICD-10-CM

## 2025-05-13 LAB
ANION GAP SERPL CALC-SCNC: 10 MMOL/L — SIGNIFICANT CHANGE UP (ref 7–14)
BUN SERPL-MCNC: 7 MG/DL — SIGNIFICANT CHANGE UP (ref 7–23)
CALCIUM SERPL-MCNC: 8.2 MG/DL — LOW (ref 8.4–10.5)
CHLORIDE SERPL-SCNC: 104 MMOL/L — SIGNIFICANT CHANGE UP (ref 98–107)
CO2 SERPL-SCNC: 23 MMOL/L — SIGNIFICANT CHANGE UP (ref 22–31)
CREAT SERPL-MCNC: 0.74 MG/DL — SIGNIFICANT CHANGE UP (ref 0.5–1.3)
EGFR: 107 ML/MIN/1.73M2 — SIGNIFICANT CHANGE UP
EGFR: 107 ML/MIN/1.73M2 — SIGNIFICANT CHANGE UP
GLUCOSE SERPL-MCNC: 84 MG/DL — SIGNIFICANT CHANGE UP (ref 70–99)
HCT VFR BLD CALC: 38.7 % — SIGNIFICANT CHANGE UP (ref 34.5–45)
HGB BLD-MCNC: 12.8 G/DL — SIGNIFICANT CHANGE UP (ref 11.5–15.5)
MAGNESIUM SERPL-MCNC: 1.9 MG/DL — SIGNIFICANT CHANGE UP (ref 1.6–2.6)
MCHC RBC-ENTMCNC: 28.8 PG — SIGNIFICANT CHANGE UP (ref 27–34)
MCHC RBC-ENTMCNC: 33.1 G/DL — SIGNIFICANT CHANGE UP (ref 32–36)
MCV RBC AUTO: 87 FL — SIGNIFICANT CHANGE UP (ref 80–100)
NRBC # BLD AUTO: 0 K/UL — SIGNIFICANT CHANGE UP (ref 0–0)
NRBC # FLD: 0 K/UL — SIGNIFICANT CHANGE UP (ref 0–0)
NRBC BLD AUTO-RTO: 0 /100 WBCS — SIGNIFICANT CHANGE UP (ref 0–0)
PHOSPHATE SERPL-MCNC: 2.5 MG/DL — SIGNIFICANT CHANGE UP (ref 2.5–4.5)
PLATELET # BLD AUTO: 165 K/UL — SIGNIFICANT CHANGE UP (ref 150–400)
POTASSIUM SERPL-MCNC: 3.8 MMOL/L — SIGNIFICANT CHANGE UP (ref 3.5–5.3)
POTASSIUM SERPL-SCNC: 3.8 MMOL/L — SIGNIFICANT CHANGE UP (ref 3.5–5.3)
RBC # BLD: 4.45 M/UL — SIGNIFICANT CHANGE UP (ref 3.8–5.2)
RBC # FLD: 12.1 % — SIGNIFICANT CHANGE UP (ref 10.3–14.5)
SODIUM SERPL-SCNC: 137 MMOL/L — SIGNIFICANT CHANGE UP (ref 135–145)
WBC # BLD: 3.65 K/UL — LOW (ref 3.8–10.5)
WBC # FLD AUTO: 3.65 K/UL — LOW (ref 3.8–10.5)

## 2025-05-13 PROCEDURE — 99233 SBSQ HOSP IP/OBS HIGH 50: CPT

## 2025-05-13 RX ORDER — ENOXAPARIN SODIUM 100 MG/ML
40 INJECTION SUBCUTANEOUS EVERY 24 HOURS
Refills: 0 | Status: DISCONTINUED | OUTPATIENT
Start: 2025-05-13 | End: 2025-05-15

## 2025-05-13 RX ADMIN — Medication 15 MILLILITER(S): at 05:07

## 2025-05-13 RX ADMIN — ESCITALOPRAM OXALATE 20 MILLIGRAM(S): 20 TABLET ORAL at 11:58

## 2025-05-13 RX ADMIN — ARIPIPRAZOLE 4 MILLIGRAM(S): 2 TABLET ORAL at 14:17

## 2025-05-13 RX ADMIN — Medication 600 MILLIGRAM(S): at 05:07

## 2025-05-13 RX ADMIN — AMPICILLIN SODIUM AND SULBACTAM SODIUM 200 GRAM(S): 1; .5 INJECTION, POWDER, FOR SOLUTION INTRAMUSCULAR; INTRAVENOUS at 02:58

## 2025-05-13 RX ADMIN — Medication 650 MILLIGRAM(S): at 11:58

## 2025-05-13 RX ADMIN — Medication 650 MILLIGRAM(S): at 12:28

## 2025-05-13 RX ADMIN — Medication 650 MILLIGRAM(S): at 23:23

## 2025-05-13 RX ADMIN — Medication 15 MILLILITER(S): at 18:58

## 2025-05-13 RX ADMIN — Medication 600 MILLIGRAM(S): at 23:23

## 2025-05-13 RX ADMIN — Medication 650 MILLIGRAM(S): at 18:57

## 2025-05-13 RX ADMIN — ROSUVASTATIN CALCIUM 5 MILLIGRAM(S): 20 TABLET, FILM COATED ORAL at 23:23

## 2025-05-13 RX ADMIN — Medication 650 MILLIGRAM(S): at 18:58

## 2025-05-13 RX ADMIN — Medication 650 MILLIGRAM(S): at 05:07

## 2025-05-13 NOTE — PROGRESS NOTE ADULT - PROBLEM SELECTOR PLAN 2
- patient does not have a history of heart disease. Denies shortness of breath or chest pain with climbing a flight of stairs. No clinical symptoms to suggest active ACS, heart failure, COPD, pulmonary embolism, or malignant arrhythmia. EKG with NSR, qtc 440, no ischemic findings, METs >3. RCRI 0 (3.9% 30-day risk of death, MI, or cardiac arrest). Tobin score with 0.3 % risk of myocardial infarction or cardiac arrest, intraoperatively or up to 30 days post-op. NSQUIP with ****  - this is a low risk patient going for an intermediate risk procedure.   - medically optimized for procedure

## 2025-05-13 NOTE — PROGRESS NOTE ADULT - TIME BILLING
review of laboratory data, radiology results, consultants' recommendations, documentation in [place], discussion with patient/ACP and interdisciplinary staff (such as , social workers, etc). Interventions were performed as documented above.

## 2025-05-13 NOTE — PROGRESS NOTE ADULT - ASSESSMENT
35 year old female with past medical hx of depression, anxiety, Gestational DM and HLD presented to the ED from  after a syncopal episode causing chin strike. Pt planned for surgery on Wednesday

## 2025-05-13 NOTE — PROGRESS NOTE ADULT - PROBLEM SELECTOR PLAN 3
- continue abilify 4 and lexapro 20, klonopin 0.25 daily in am to avoid withdrawal (chronic med since 8/24 - confirmed on ISTOP)

## 2025-05-13 NOTE — PROGRESS NOTE ADULT - PROBLEM SELECTOR PLAN 1
- orthostatics negative  - EKG at [place]: NSR, qtc 440, no ischemic findings  - TTE: no acute findings  - recommend appropriate oral intake and if npo, IV fluids    likely 2/2 weight loss medication and non cardiac in origin. Pt has lost >30 ponds in past 3 months. Pt discussed with outpt doctor and plans to stop zepbound.

## 2025-05-14 ENCOUNTER — TRANSCRIPTION ENCOUNTER (OUTPATIENT)
Age: 35
End: 2025-05-14

## 2025-05-14 ENCOUNTER — APPOINTMENT (OUTPATIENT)
Age: 35
End: 2025-05-14

## 2025-05-14 LAB
ANION GAP SERPL CALC-SCNC: 14 MMOL/L — SIGNIFICANT CHANGE UP (ref 7–14)
APTT BLD: 29.1 SEC — SIGNIFICANT CHANGE UP (ref 26.1–36.8)
BUN SERPL-MCNC: 5 MG/DL — LOW (ref 7–23)
CALCIUM SERPL-MCNC: 8.2 MG/DL — LOW (ref 8.4–10.5)
CHLORIDE SERPL-SCNC: 105 MMOL/L — SIGNIFICANT CHANGE UP (ref 98–107)
CO2 SERPL-SCNC: 22 MMOL/L — SIGNIFICANT CHANGE UP (ref 22–31)
CREAT SERPL-MCNC: 0.66 MG/DL — SIGNIFICANT CHANGE UP (ref 0.5–1.3)
EGFR: 117 ML/MIN/1.73M2 — SIGNIFICANT CHANGE UP
EGFR: 117 ML/MIN/1.73M2 — SIGNIFICANT CHANGE UP
GLUCOSE SERPL-MCNC: 96 MG/DL — SIGNIFICANT CHANGE UP (ref 70–99)
HCG SERPL-ACNC: <1 MIU/ML — SIGNIFICANT CHANGE UP
HCT VFR BLD CALC: 37.3 % — SIGNIFICANT CHANGE UP (ref 34.5–45)
HGB BLD-MCNC: 12.6 G/DL — SIGNIFICANT CHANGE UP (ref 11.5–15.5)
INR BLD: 1.03 RATIO — SIGNIFICANT CHANGE UP (ref 0.85–1.16)
MAGNESIUM SERPL-MCNC: 1.8 MG/DL — SIGNIFICANT CHANGE UP (ref 1.6–2.6)
MCHC RBC-ENTMCNC: 28.6 PG — SIGNIFICANT CHANGE UP (ref 27–34)
MCHC RBC-ENTMCNC: 33.8 G/DL — SIGNIFICANT CHANGE UP (ref 32–36)
MCV RBC AUTO: 84.6 FL — SIGNIFICANT CHANGE UP (ref 80–100)
NRBC # BLD AUTO: 0 K/UL — SIGNIFICANT CHANGE UP (ref 0–0)
NRBC # FLD: 0 K/UL — SIGNIFICANT CHANGE UP (ref 0–0)
NRBC BLD AUTO-RTO: 0 /100 WBCS — SIGNIFICANT CHANGE UP (ref 0–0)
PHOSPHATE SERPL-MCNC: 2.6 MG/DL — SIGNIFICANT CHANGE UP (ref 2.5–4.5)
PLATELET # BLD AUTO: 168 K/UL — SIGNIFICANT CHANGE UP (ref 150–400)
POTASSIUM SERPL-MCNC: 3.8 MMOL/L — SIGNIFICANT CHANGE UP (ref 3.5–5.3)
POTASSIUM SERPL-SCNC: 3.8 MMOL/L — SIGNIFICANT CHANGE UP (ref 3.5–5.3)
PROTHROM AB SERPL-ACNC: 11.9 SEC — SIGNIFICANT CHANGE UP (ref 9.9–13.4)
RBC # BLD: 4.41 M/UL — SIGNIFICANT CHANGE UP (ref 3.8–5.2)
RBC # FLD: 12 % — SIGNIFICANT CHANGE UP (ref 10.3–14.5)
SODIUM SERPL-SCNC: 141 MMOL/L — SIGNIFICANT CHANGE UP (ref 135–145)
WBC # BLD: 3.71 K/UL — LOW (ref 3.8–10.5)
WBC # FLD AUTO: 3.71 K/UL — LOW (ref 3.8–10.5)

## 2025-05-14 PROCEDURE — 70486 CT MAXILLOFACIAL W/O DYE: CPT | Mod: 26

## 2025-05-14 PROCEDURE — 21465 OPTX MNDBLR CNDYLR FX: CPT | Mod: LT

## 2025-05-14 DEVICE — PLATE LOKG TI 4H LNG 1MM THK: Type: IMPLANTABLE DEVICE | Site: LEFT | Status: FUNCTIONAL

## 2025-05-14 DEVICE — SCREW MAXDRIVE CMF LVL 1 2X8MM: Type: IMPLANTABLE DEVICE | Site: LEFT | Status: FUNCTIONAL

## 2025-05-14 DEVICE — PLATE LOCKING FRACTURE 4HOLE 28MM NONCOMP 1.5MM THCK TI6AL4V: Type: IMPLANTABLE DEVICE | Site: LEFT | Status: FUNCTIONAL

## 2025-05-14 DEVICE — SCREW MAXDRIVE 2X5MM: Type: IMPLANTABLE DEVICE | Site: LEFT | Status: FUNCTIONAL

## 2025-05-14 RX ORDER — ONDANSETRON HCL/PF 4 MG/2 ML
4 VIAL (ML) INJECTION ONCE
Refills: 0 | Status: DISCONTINUED | OUTPATIENT
Start: 2025-05-14 | End: 2025-05-14

## 2025-05-14 RX ORDER — OXYCODONE HYDROCHLORIDE 30 MG/1
5 TABLET ORAL ONCE
Refills: 0 | Status: DISCONTINUED | OUTPATIENT
Start: 2025-05-14 | End: 2025-05-14

## 2025-05-14 RX ORDER — HYDROMORPHONE/SOD CHLOR,ISO/PF 2 MG/10 ML
0.5 SYRINGE (ML) INJECTION
Refills: 0 | Status: DISCONTINUED | OUTPATIENT
Start: 2025-05-14 | End: 2025-05-14

## 2025-05-14 RX ADMIN — ROSUVASTATIN CALCIUM 5 MILLIGRAM(S): 20 TABLET, FILM COATED ORAL at 22:02

## 2025-05-14 RX ADMIN — Medication 650 MILLIGRAM(S): at 18:25

## 2025-05-14 RX ADMIN — Medication 15 MILLILITER(S): at 18:26

## 2025-05-14 RX ADMIN — Medication 650 MILLIGRAM(S): at 05:17

## 2025-05-14 RX ADMIN — Medication 650 MILLIGRAM(S): at 12:09

## 2025-05-14 RX ADMIN — Medication 0.5 MILLIGRAM(S): at 17:11

## 2025-05-14 RX ADMIN — Medication 650 MILLIGRAM(S): at 18:27

## 2025-05-14 RX ADMIN — Medication 15 MILLILITER(S): at 05:17

## 2025-05-14 RX ADMIN — SODIUM CHLORIDE 100 MILLILITER(S): 9 INJECTION, SOLUTION INTRAVENOUS at 18:27

## 2025-05-14 RX ADMIN — Medication 600 MILLIGRAM(S): at 19:35

## 2025-05-14 RX ADMIN — ESCITALOPRAM OXALATE 20 MILLIGRAM(S): 20 TABLET ORAL at 11:40

## 2025-05-14 RX ADMIN — Medication 650 MILLIGRAM(S): at 11:39

## 2025-05-14 RX ADMIN — Medication 0.5 MILLIGRAM(S): at 17:48

## 2025-05-14 RX ADMIN — ARIPIPRAZOLE 4 MILLIGRAM(S): 2 TABLET ORAL at 11:40

## 2025-05-14 RX ADMIN — Medication 600 MILLIGRAM(S): at 11:40

## 2025-05-14 RX ADMIN — Medication 600 MILLIGRAM(S): at 05:17

## 2025-05-14 RX ADMIN — Medication 600 MILLIGRAM(S): at 12:10

## 2025-05-14 NOTE — ASU PREOP CHECKLIST - AICD PRESENT
no

## 2025-05-14 NOTE — DISCHARGE NOTE PROVIDER - HOSPITAL COURSE
HD1: 35-year-old female, history of anxiety and depression on Klonopin and Abilify, hyperlipidemia on rosuvastatin, presents as a transfer from [place] for OMFS evaluation.  Patient reports that earlier today she got up too fast from the couch and syncopized.  She woke up on the ground with a chin laceration.  Went to [facility] and received 20 stitches and a CT max face which showed an acute displaced and angulated left mandibular condyle fracture. Transferred ED-ED to [facility] for OMFS consult. Patient currently denies any fever, chest pain, dizziness, shortness of breath, abdominal pain, nausea or vomiting.  Is still in significant pain in her left jaw.  Denies any prior history of syncope.  Not on any blood thinners.    HD2: Patient is a 35y Female admitted for left displaced mandibular condylar fracture.   S: JESSEE overnight. Pt seen and evaluated bedside this AM. Pt resting comfortably on exam. Tolerating Diet. Ambulating and voiding without issue. Pain well controlled.    [id number]: S/P ORIF of Left subcondylar fracture HD1: 35-year-old female, history of anxiety and depression on Klonopin and Abilify, hyperlipidemia on rosuvastatin, presents as a transfer from [place] for OMFS evaluation.  Patient reports that earlier today she got up too fast from the couch and syncopized.  She woke up on the ground with a chin laceration.  Went to [facility] and received 20 stitches and a CT max face which showed an acute displaced and angulated left mandibular condyle fracture. Transferred ED-ED to [facility] for OMFS consult. Patient currently denies any fever, chest pain, dizziness, shortness of breath, abdominal pain, nausea or vomiting.  Is still in significant pain in her left jaw.  Denies any prior history of syncope.  Not on any blood thinners.    HD2: Patient is a 35y Female admitted for left displaced mandibular condylar fracture.   S: JESSEE overnight. Pt seen and evaluated bedside this AM. Pt resting comfortably on exam. Tolerating Diet. Ambulating and voiding without issue. Pain well controlled.    [id number]: S/P ORIF of Left subcondylar fracture  [id number]: NAEONUSAMA, tolerating PO intake, voiding, ambulating, ready to go home

## 2025-05-14 NOTE — DISCHARGE NOTE PROVIDER - NSDCMRMEDTOKEN_GEN_ALL_CORE_FT
acetaminophen 160 mg/5 mL oral suspension: 20 milliliter(s) orally every 6 hours  ARIPiprazole 2 mg oral tablet: 2 tab(s) orally once a day  clonazePAM: 0.25 milligram(s) once a day  escitalopram 20 mg oral tablet: 1 tab(s) orally once a day  ibuprofen 100 mg/5 mL oral suspension: 30 milliliter(s) orally every 6 hours  oxyCODONE 5 mg/5 mL oral solution: 5 milliliter(s) orally every 6 hours as needed for  severe pain MDD: 20  Peridex 0.12% mucous membrane liquid: 15 milliliter(s) orally 2 times a day  rosuvastatin 5 mg oral tablet: 1 tab(s) orally once a day (at bedtime)

## 2025-05-14 NOTE — DISCHARGE NOTE PROVIDER - PROVIDER TOKENS
FREE:[LAST:[Trevor],FIRST:[Evelin],PHONE:[(   )    -],FAX:[(   )    -],ADDRESS:[Oral/Maxillofacial Surgery  [address] [place] [id number]  Phone: [phone]  Fax: [phone]],FOLLOWUP:[1 week],ESTABLISHEDPATIENT:[T]]

## 2025-05-14 NOTE — DISCHARGE NOTE PROVIDER - NSDCFUADDINST_GEN_ALL_CORE_FT
PAIN: You may continue to take Acetaminophen (Tylenol) and Ibuprofen over the counter for pain. You can alternate the two medications, giving one every 3 hours. We recommend taking the medications around the clock for the first few days at home after surgery. Then you can start taking them only as needed for pain. Take oxycodone only for pain not controlled with tylenol and ibuprofen alone.  WOUND CARE:  You may brush your teeth, but be careful of the surgical area and stitches nearby. Use prescription chlorhexidine mouth rinse twice a day.  ACTIVITY: No heavy lifting, straining, or vigorous activity until your follow-up appointment   NOTIFY US IF: There is any bleeding that does not stop, any pus draining from wound(s), any fever (over 100.5 F) or chills, persistent nausea/vomiting, persistent diarrhea, or if pain is not controlled on their discharge pain medications.  FOLLOW-UP: Please call the office and make an appointment to follow up with Dr Murray in 1 week

## 2025-05-14 NOTE — DISCHARGE NOTE PROVIDER - CARE PROVIDER_API CALL
Evelin Murray  Oral/Maxillofacial Surgery  [id number] [place] [address]  Phone: [phone]  Fax: [phone]  Phone: (   )    -  Fax: (   )    -  Established Patient  Follow Up Time: 1 week

## 2025-05-14 NOTE — PROGRESS NOTE ADULT - ASSESSMENT
35F, PMHx of anxiety, depression, HLD, synopsized today and fell on her face, transferred from  for L displaced mandibular condylar fx.      - Patient to go to the OR today at 2:30PM.   - NPO  - Multimodal pain medication       Phuong Griffin  Oral and Maxillofacial Surgery  [facility] Pager #[phone]  [facility] Pager: [phone]  Available on Teams

## 2025-05-14 NOTE — DISCHARGE NOTE PROVIDER - NSDCCPTREATMENT_GEN_ALL_CORE_FT
PRINCIPAL PROCEDURE  Procedure: Open reduction and internal fixation (ORIF) of subcondylar fracture of mandible  Findings and Treatment:

## 2025-05-14 NOTE — DISCHARGE NOTE PROVIDER - NSDCCPCAREPLAN_GEN_ALL_CORE_FT
PRINCIPAL DISCHARGE DIAGNOSIS  Diagnosis: Mandibular fracture  Assessment and Plan of Treatment:

## 2025-05-15 ENCOUNTER — TRANSCRIPTION ENCOUNTER (OUTPATIENT)
Age: 35
End: 2025-05-15

## 2025-05-15 VITALS
SYSTOLIC BLOOD PRESSURE: 117 MMHG | TEMPERATURE: 98 F | HEART RATE: 59 BPM | OXYGEN SATURATION: 99 % | RESPIRATION RATE: 18 BRPM | DIASTOLIC BLOOD PRESSURE: 65 MMHG

## 2025-05-15 PROBLEM — Z00.00 ENCOUNTER FOR PREVENTIVE HEALTH EXAMINATION: Noted: 2025-05-15

## 2025-05-15 LAB
ANION GAP SERPL CALC-SCNC: 12 MMOL/L — SIGNIFICANT CHANGE UP (ref 7–14)
BUN SERPL-MCNC: 6 MG/DL — LOW (ref 7–23)
CALCIUM SERPL-MCNC: 8.4 MG/DL — SIGNIFICANT CHANGE UP (ref 8.4–10.5)
CHLORIDE SERPL-SCNC: 103 MMOL/L — SIGNIFICANT CHANGE UP (ref 98–107)
CO2 SERPL-SCNC: 23 MMOL/L — SIGNIFICANT CHANGE UP (ref 22–31)
CREAT SERPL-MCNC: 0.65 MG/DL — SIGNIFICANT CHANGE UP (ref 0.5–1.3)
EGFR: 117 ML/MIN/1.73M2 — SIGNIFICANT CHANGE UP
EGFR: 117 ML/MIN/1.73M2 — SIGNIFICANT CHANGE UP
GLUCOSE SERPL-MCNC: 139 MG/DL — HIGH (ref 70–99)
HCT VFR BLD CALC: 37.7 % — SIGNIFICANT CHANGE UP (ref 34.5–45)
HGB BLD-MCNC: 12.5 G/DL — SIGNIFICANT CHANGE UP (ref 11.5–15.5)
MAGNESIUM SERPL-MCNC: 1.7 MG/DL — SIGNIFICANT CHANGE UP (ref 1.6–2.6)
MCHC RBC-ENTMCNC: 28.9 PG — SIGNIFICANT CHANGE UP (ref 27–34)
MCHC RBC-ENTMCNC: 33.2 G/DL — SIGNIFICANT CHANGE UP (ref 32–36)
MCV RBC AUTO: 87.3 FL — SIGNIFICANT CHANGE UP (ref 80–100)
NRBC # BLD AUTO: 0 K/UL — SIGNIFICANT CHANGE UP (ref 0–0)
NRBC # FLD: 0 K/UL — SIGNIFICANT CHANGE UP (ref 0–0)
NRBC BLD AUTO-RTO: 0 /100 WBCS — SIGNIFICANT CHANGE UP (ref 0–0)
PHOSPHATE SERPL-MCNC: 2.9 MG/DL — SIGNIFICANT CHANGE UP (ref 2.5–4.5)
PLATELET # BLD AUTO: 193 K/UL — SIGNIFICANT CHANGE UP (ref 150–400)
POTASSIUM SERPL-MCNC: 3.9 MMOL/L — SIGNIFICANT CHANGE UP (ref 3.5–5.3)
POTASSIUM SERPL-SCNC: 3.9 MMOL/L — SIGNIFICANT CHANGE UP (ref 3.5–5.3)
RBC # BLD: 4.32 M/UL — SIGNIFICANT CHANGE UP (ref 3.8–5.2)
RBC # FLD: 12 % — SIGNIFICANT CHANGE UP (ref 10.3–14.5)
SODIUM SERPL-SCNC: 138 MMOL/L — SIGNIFICANT CHANGE UP (ref 135–145)
WBC # BLD: 7.73 K/UL — SIGNIFICANT CHANGE UP (ref 3.8–10.5)
WBC # FLD AUTO: 7.73 K/UL — SIGNIFICANT CHANGE UP (ref 3.8–10.5)

## 2025-05-15 RX ORDER — ACETAMINOPHEN 500 MG/5ML
20 LIQUID (ML) ORAL
Qty: 1120 | Refills: 0
Start: 2025-05-15 | End: 2025-05-28

## 2025-05-15 RX ORDER — IBUPROFEN 200 MG
30 TABLET ORAL
Qty: 840 | Refills: 0
Start: 2025-05-15 | End: 2025-05-21

## 2025-05-15 RX ORDER — ROSUVASTATIN CALCIUM 20 MG/1
1 TABLET, FILM COATED ORAL
Qty: 0 | Refills: 0 | DISCHARGE
Start: 2025-05-15

## 2025-05-15 RX ORDER — CLONAZEPAM 0.5 MG/1
0.25 TABLET ORAL
Qty: 0 | Refills: 0 | DISCHARGE
Start: 2025-05-15

## 2025-05-15 RX ORDER — ARIPIPRAZOLE 2 MG/1
2 TABLET ORAL
Qty: 0 | Refills: 0 | DISCHARGE
Start: 2025-05-15

## 2025-05-15 RX ORDER — ESCITALOPRAM OXALATE 20 MG/1
1 TABLET ORAL
Qty: 0 | Refills: 0 | DISCHARGE
Start: 2025-05-15

## 2025-05-15 RX ORDER — OXYCODONE HYDROCHLORIDE 30 MG/1
5 TABLET ORAL
Qty: 60 | Refills: 0
Start: 2025-05-15 | End: 2025-05-17

## 2025-05-15 RX ADMIN — Medication 650 MILLIGRAM(S): at 01:03

## 2025-05-15 RX ADMIN — Medication 600 MILLIGRAM(S): at 06:55

## 2025-05-15 RX ADMIN — Medication 650 MILLIGRAM(S): at 06:55

## 2025-05-15 RX ADMIN — Medication 650 MILLIGRAM(S): at 15:23

## 2025-05-15 RX ADMIN — ARIPIPRAZOLE 4 MILLIGRAM(S): 2 TABLET ORAL at 11:08

## 2025-05-15 RX ADMIN — Medication 600 MILLIGRAM(S): at 06:23

## 2025-05-15 RX ADMIN — Medication 600 MILLIGRAM(S): at 01:33

## 2025-05-15 RX ADMIN — Medication 15 MILLILITER(S): at 06:25

## 2025-05-15 RX ADMIN — ESCITALOPRAM OXALATE 20 MILLIGRAM(S): 20 TABLET ORAL at 11:08

## 2025-05-15 RX ADMIN — Medication 650 MILLIGRAM(S): at 06:22

## 2025-05-15 RX ADMIN — Medication 650 MILLIGRAM(S): at 01:33

## 2025-05-15 RX ADMIN — Medication 600 MILLIGRAM(S): at 01:03

## 2025-05-15 RX ADMIN — Medication 600 MILLIGRAM(S): at 15:23

## 2025-05-15 NOTE — DISCHARGE NOTE NURSING/CASE MANAGEMENT/SOCIAL WORK - PATIENT PORTAL LINK FT
You can access the FollowMyHealth Patient Portal offered by [facility] by registering at the following website: [email or web address]. By joining "[employer]’s FollowMyHealth portal, you will also be able to view your health information using other applications (apps) compatible with our system.

## 2025-05-15 NOTE — PROGRESS NOTE ADULT - ASSESSMENT
35M PMHx of anxiety, depression, HLD, now POD1 s/p ORIF of left subcondylar fracture.     Plan:  FLD  IV abx while inpt then augmentin when outpatient   Multimodal pain medication   Patient medically clear for D/c today.     Brennan FONSECA   [facility] [address]   [facility] Pager: [phone]  Available in Teams

## 2025-05-15 NOTE — PROGRESS NOTE ADULT - SUBJECTIVE AND OBJECTIVE BOX
ANESTHESIA POSTOP NOTE  35y Female POSTOP DAY 1    Vital Signs Last 24 Hrs  T(C): 36.6 (15 May 2025 09:45), Max: 37.8 (14 May 2025 16:35)  T(F): 97.9 (15 May 2025 09:45), Max: 100.1 (14 May 2025 16:35)  HR: 84 (15 May 2025 09:45) (65 - 97)  BP: 114/80 (15 May 2025 09:45) (114/80 - 140/89)  BP(mean): 95 (14 May 2025 18:00) (91 - 104)  RR: 18 (15 May 2025 09:45) (12 - 18)  SpO2: 97% (15 May 2025 09:45) (92% - 100%)    Parameters below as of 15 May 2025 09:45  Patient On (Oxygen Delivery Method): room air      I&O's Summary    14 May 2025 07:01  -  15 May 2025 07:00  --------------------------------------------------------  IN: 2100 mL / OUT: 0 mL / NET: 2100 mL    15 May 2025 07:01  -  15 May 2025 13:24  --------------------------------------------------------  IN: 600 mL / OUT: 0 mL / NET: 600 mL        [ X ] NO APPARENT ANESTHESIA COMPLICATIONS  
ORAL & MAXILLOFACIAL SURGERY PROGRESS NOTE    Fracture of mandible, unspecified, initial encounter for closed fracture        EFRA SHOEMAKER  |  [id number]      Patient is a 35y Female admitted for left displaced mandibular condylar fracture.       S: JESSEE overnight. Pt seen and evaluated bedside this AM. Pt resting comfortably on exam. Tolerating Diet. Ambulating and voiding without issue. Pain well controlled.    MEDICATIONS  (STANDING):  acetaminophen   Oral Liquid .. 650 milliGRAM(s) Oral every 6 hours  ARIPiprazole 4 milliGRAM(s) Oral daily  chlorhexidine 0.12% Liquid 15 milliLiter(s) Oral Mucosa two times a day  clonazePAM  Tablet 0.25 milliGRAM(s) Oral daily  enoxaparin Injectable 40 milliGRAM(s) SubCutaneous every 24 hours  escitalopram 20 milliGRAM(s) Oral daily  ibuprofen  Suspension. 600 milliGRAM(s) Oral every 6 hours  lactated ringers. 1000 milliLiter(s) (100 mL/Hr) IV Continuous <Continuous>  rosuvastatin 5 milliGRAM(s) Oral at bedtime    MEDICATIONS  (PRN):  ondansetron Injectable 4 milliGRAM(s) IV Push every 8 hours PRN Nausea and/or Vomiting  oxyCODONE    Solution 5 milliGRAM(s) Oral every 4 hours PRN Moderate Pain (4 - 6)  oxyCODONE    Solution 10 milliGRAM(s) Oral every 4 hours PRN Severe Pain (7 - 10)      O:   Vital Signs Last 24 Hrs  T(C): 36.4 (13 May 2025 05:05), Max: 36.8 (12 May 2025 09:23)  T(F): 97.5 (13 May 2025 05:05), Max: 98.3 (12 May 2025 09:23)  HR: 77 (13 May 2025 05:05) (55 - 80)  BP: 106/62 (13 May 2025 05:05) (94/67 - 106/62)  BP(mean): 77 (12 May 2025 09:23) (77 - 77)  RR: 17 (13 May 2025 05:05) (12 - 18)  SpO2: 98% (13 May 2025 05:05) (97% - 100%)    Parameters below as of 13 May 2025 05:05  Patient On (Oxygen Delivery Method): room air        PHYSICAL EXAM:  Gen: AAox3, NAD, NC/AT  Head: no Lacerations/abrasions/hematomas. no edema/erythema/tenderness to palpation. no asymmetry. no signs of scalp infection  Eyes: EOMI, PERRL, visual acuity intact, no diplopia,  supra/infra orbital rims intact, no subconjunctival heme, no telecanthus, no exophthalmos  Ears: Gross hearing intact, No heme appreciated, Condylar head palpated  Nose: no crepitis/septal hematoma/asymmetry.  no Lacerations/abrasions/hematomas.  Malar: no malar depression, no CN V-2 paresthesia  Throat: no LAD, supple, FROM, repaired chin lac hemostatic      Extraoral/Intraoral Exam: TTP over L periauricular area, JAIRO: ~30mm, pain on opening, Dentition grossly intact, occlusion stable and reproducible, no lacerations/abrasions/hematomas, no mandibular step deformity, no CN V-3 paresthesia, no signs of infection, no edema/erythema/tenderness to palpation. FOM soft, NT. no mobility of maxilla/crepitis. TMJ: no clicking/popping  CN II-XII intact                          12.8   3.65  )-----------( 165      ( 13 May 2025 05:05 )             38.7     05-13    137  |  104  |  7   ----------------------------<  84  3.8   |  23  |  0.74    Ca    8.2[L]      13 May 2025 05:05  Phos  2.5     05-13  Mg     1.90     05-13    TPro  6.5  /  Alb  3.8  /  TBili  0.5  /  DBili  x   /  AST  29  /  ALT  55[H]  /  AlkPhos  66  05-12 05-12-25 @ 07:01  -  05-13-25 @ 07:00  --------------------------------------------------------  IN: 1560 mL / OUT: 0 mL / NET: 1560 mL        IMAGING STUDIES:      
ORAL & MAXILLOFACIAL SURGERY PROGRESS NOTE    Fracture of mandible, unspecified, initial encounter for closed fracture        EFRA SHOEMAKER  |  [id number]      Patient is a 35y Female POD1 s/p ORIF of left displaced mandibular condylar fracture.       S: JESSEE overnight. Pt seen and evaluated bedside this AM. Pt resting comfortably on exam. Tolerating Diet. Ambulating and voiding without issue. Pain well controlled.  CT max face depicts a left mandibular fixation in anatomic    MEDICATIONS  (STANDING):  acetaminophen   Oral Liquid .. 650 milliGRAM(s) Oral every 6 hours  ARIPiprazole 4 milliGRAM(s) Oral daily  chlorhexidine 0.12% Liquid 15 milliLiter(s) Oral Mucosa two times a day  clonazePAM  Tablet 0.25 milliGRAM(s) Oral daily  enoxaparin Injectable 40 milliGRAM(s) SubCutaneous every 24 hours  escitalopram 20 milliGRAM(s) Oral daily  ibuprofen  Suspension. 600 milliGRAM(s) Oral every 6 hours  lactated ringers. 1000 milliLiter(s) (100 mL/Hr) IV Continuous <Continuous>  rosuvastatin 5 milliGRAM(s) Oral at bedtime    MEDICATIONS  (PRN):  ondansetron Injectable 4 milliGRAM(s) IV Push every 8 hours PRN Nausea and/or Vomiting  oxyCODONE    Solution 5 milliGRAM(s) Oral every 4 hours PRN Moderate Pain (4 - 6)  oxyCODONE    Solution 10 milliGRAM(s) Oral every 4 hours PRN Severe Pain (7 - 10)      O:   Vital Signs Last 24 Hrs  T(C): 36.7 (15 May 2025 06:25), Max: 37.8 (14 May 2025 16:35)  T(F): 98.1 (15 May 2025 06:25), Max: 100.1 (14 May 2025 16:35)  HR: 83 (15 May 2025 06:25) (64 - 97)  BP: 128/84 (15 May 2025 06:25) (105/60 - 140/89)  BP(mean): 95 (14 May 2025 18:00) (91 - 104)  RR: 17 (15 May 2025 06:25) (12 - 18)  SpO2: 99% (15 May 2025 06:25) (92% - 100%)    Parameters below as of 15 May 2025 06:25  Patient On (Oxygen Delivery Method): room air        PHYSICAL EXAM:  Gen: AAox3, NAD, NC/AT  Head: no Lacerations/abrasions/hematomas. no edema/erythema/tenderness to palpation. no asymmetry. no signs of scalp infection  Eyes: EOMI, PERRL, visual acuity intact, no diplopia,  supra/infra orbital rims intact, no subconjunctival heme, no telecanthus, no exophthalmos  Ears: Gross hearing intact, No heme appreciated, Condylar head palpated  Nose: no crepitis/septal hematoma/asymmetry.  no Lacerations/abrasions/hematomas.  Malar: no malar depression, no CN V-2 paresthesia  Throat: no LAD, supple, FROM, repaired chin lac hemostatic      Extraoral/Intraoral Exam: TTP over L periauricular area, JAIRO: ~30mm, pain on opening, Dentition grossly intact, occlusion stable and reproducible, no lacerations/abrasions/hematomas, no mandibular step deformity, no CN V-3 paresthesia, no signs of infection, no edema/erythema/tenderness to palpation. FOM soft, NT. no mobility of maxilla/crepitis. TMJ: no clicking/popping  CN II-XII intact                          12.6   3.71  )-----------( 168      ( 14 May 2025 05:27 )             37.3     05-14    141  |  105  |  5[L]  ----------------------------<  96  3.8   |  22  |  0.66    Ca    8.2[L]      14 May 2025 05:27  Phos  2.6     05-14  Mg     1.80     05-14 05-13-25 @ 07:01  -  05-14-25 @ 07:00  --------------------------------------------------------  IN: 1440 mL / OUT: 0 mL / NET: 1440 mL    05-14-25 @ 07:01  -  05-15-25 @ 06:46  --------------------------------------------------------  IN: 2100 mL / OUT: 0 mL / NET: 2100 mL        IMAGING STUDIES:      
Patient is a 35y Female admitted for left displaced mandibular condylar fracture.       S: JESSEE overnight. Pt seen and evaluated bedside this AM. Pt resting comfortably on exam. Tolerating Diet. Ambulating and voiding without issue. Pain well controlled.      PHYSICAL EXAM:  Gen: AAox3, NAD, NC/AT  Head: no Lacerations/abrasions/hematomas. no edema/erythema/tenderness to palpation. no asymmetry. no signs of scalp infection  Eyes: EOMI, PERRL, visual acuity intact, no diplopia,  supra/infra orbital rims intact, no subconjunctival heme, no telecanthus, no exophthalmos  Ears: Gross hearing intact, No heme appreciated, Condylar head palpated  Nose: no crepitis/septal hematoma/asymmetry.  no Lacerations/abrasions/hematomas.  Malar: no malar depression, no CN V-2 paresthesia  Throat: no LAD, supple, FROM, repaired chin lac hemostatic      Extraoral/Intraoral Exam: TTP over L periauricular area, JAIRO: ~30mm, pain on opening, Dentition grossly intact, occlusion stable and reproducible, no lacerations/abrasions/hematomas, no mandibular step deformity, no CN V-3 paresthesia, no signs of infection, no edema/erythema/tenderness to palpation. FOM soft, NT. no mobility of maxilla/crepitis. TMJ: no clicking/popping  CN II-XII intact
Attending Attestation (For Attendings USE Only)...
[facility]  Nikojesus manuelforrestjayce DO David  Available via MS Teams  Pager: [phone]    Patient is a 35y old  Female who presents with a chief complaint of Left displaced mandibular condylar fracture (13 May 2025 08:00)      SUBJECTIVE / OVERNIGHT EVENTS:    Pt seen and examined this morning. Pt states she feels well today. States she spoke with her doctor regarding weight loss med and plans to stop medication.    ADDITIONAL REVIEW OF SYSTEMS:  No Fever, chills, chest pain, shortness of breath.     MEDICATIONS  (STANDING):  acetaminophen   Oral Liquid .. 650 milliGRAM(s) Oral every 6 hours  ARIPiprazole 4 milliGRAM(s) Oral daily  chlorhexidine 0.12% Liquid 15 milliLiter(s) Oral Mucosa two times a day  clonazePAM  Tablet 0.25 milliGRAM(s) Oral daily  enoxaparin Injectable 40 milliGRAM(s) SubCutaneous every 24 hours  escitalopram 20 milliGRAM(s) Oral daily  ibuprofen  Suspension. 600 milliGRAM(s) Oral every 6 hours  lactated ringers. 1000 milliLiter(s) (100 mL/Hr) IV Continuous <Continuous>  rosuvastatin 5 milliGRAM(s) Oral at bedtime    MEDICATIONS  (PRN):  ondansetron Injectable 4 milliGRAM(s) IV Push every 8 hours PRN Nausea and/or Vomiting  oxyCODONE    Solution 5 milliGRAM(s) Oral every 4 hours PRN Moderate Pain (4 - 6)  oxyCODONE    Solution 10 milliGRAM(s) Oral every 4 hours PRN Severe Pain (7 - 10)      I&O's Summary    12 May 2025 07:01  -  13 May 2025 07:00  --------------------------------------------------------  IN: 1560 mL / OUT: 0 mL / NET: 1560 mL        PHYSICAL EXAM:  Vital Signs Last 24 Hrs  T(C): 36.4 (13 May 2025 05:05), Max: 36.8 (12 May 2025 13:21)  T(F): 97.5 (13 May 2025 05:05), Max: 98.2 (12 May 2025 13:21)  HR: 77 (13 May 2025 05:05) (55 - 80)  BP: 106/62 (13 May 2025 05:05) (95/55 - 106/62)  BP(mean): --  RR: 17 (13 May 2025 05:05) (15 - 18)  SpO2: 98% (13 May 2025 05:05) (97% - 100%)    Parameters below as of 13 May 2025 05:05  Patient On (Oxygen Delivery Method): room air      CONSTITUTIONAL: NAD  EYES: PERRLA  ENMT: Moist oral mucosa  RESPIRATORY: Normal respiratory effort; lungs are clear to auscultation bilaterally  CARDIOVASCULAR: Regular rate and rhythm, normal S1 and S2  ABDOMEN: Nontender to palpation, normoactive bowel sounds  PSYCH: A+O to person, place, and time  NEUROLOGY: CN 2-12 are intact and symmetric; no gross sensory deficits     LABS:                        12.8   3.65  )-----------( 165      ( 13 May 2025 05:05 )             38.7     05-13    137  |  104  |  7   ----------------------------<  84  3.8   |  23  |  0.74    Ca    8.2[L]      13 May 2025 05:05  Phos  2.5     05-13  Mg     1.90     05-13    TPro  6.5  /  Alb  3.8  /  TBili  0.5  /  DBili  x   /  AST  29  /  ALT  55[H]  /  AlkPhos  66  05-12    PT/INR - ( 12 May 2025 08:50 )   PT: 11.5 sec;   INR: 0.99 ratio         PTT - ( 12 May 2025 08:50 )  PTT:28.6 sec      Urinalysis Basic - ( 13 May 2025 05:05 )    Color: x / Appearance: x / SG: x / pH: x  Gluc: 84 mg/dL / Ketone: x  / Bili: x / Urobili: x   Blood: x / Protein: x / Nitrite: x   Leuk Esterase: x / RBC: x / WBC x   Sq Epi: x / Non Sq Epi: x / Bacteria: x

## 2025-05-15 NOTE — PROGRESS NOTE ADULT - REASON FOR ADMISSION
Left displaced mandibular condylar fracture

## 2025-05-16 PROBLEM — E78.5 HYPERLIPIDEMIA, UNSPECIFIED: Chronic | Status: ACTIVE | Noted: 2025-05-11

## 2025-05-22 ENCOUNTER — APPOINTMENT (OUTPATIENT)
Age: 35
End: 2025-05-22
Payer: COMMERCIAL

## 2025-05-22 PROBLEM — E78.5 HYPERLIPIDEMIA, UNSPECIFIED: Chronic | Status: ACTIVE | Noted: 2025-05-12

## 2025-05-22 PROCEDURE — 99024 POSTOP FOLLOW-UP VISIT: CPT

## 2025-05-29 ENCOUNTER — APPOINTMENT (OUTPATIENT)
Age: 35
End: 2025-05-29
Payer: COMMERCIAL

## 2025-05-29 PROCEDURE — 99024 POSTOP FOLLOW-UP VISIT: CPT

## 2025-06-12 ENCOUNTER — APPOINTMENT (OUTPATIENT)
Age: 35
End: 2025-06-12
Payer: COMMERCIAL

## 2025-06-12 PROCEDURE — 99024 POSTOP FOLLOW-UP VISIT: CPT

## 2025-07-24 ENCOUNTER — APPOINTMENT (OUTPATIENT)
Age: 35
End: 2025-07-24
Payer: COMMERCIAL

## 2025-07-24 PROCEDURE — 99024 POSTOP FOLLOW-UP VISIT: CPT

## (undated) DEVICE — DRSG STERISTRIPS 0.5 X 4"

## (undated) DEVICE — DRSG KERLIX ROLL LG 4.5"

## (undated) DEVICE — DRSG MASTISOL

## (undated) DEVICE — STAPLER SKIN VISI-STAT 35 WIDE

## (undated) DEVICE — PACK MINOR

## (undated) DEVICE — SUT PLAIN GUT FAST ABSORBING 5-0 PC-1

## (undated) DEVICE — GLV 8.5 PROTEXIS (WHITE)

## (undated) DEVICE — DRSG SPANDAGE TUBULAR ELASTIC RETAINER NET SIZE 7

## (undated) DEVICE — VENODYNE/SCD SLEEVE CALF MEDIUM

## (undated) DEVICE — PREP BETADINE KIT

## (undated) DEVICE — DRILL BIT KLS MARTIN TWIST 1.5 X 50 5MM STOP

## (undated) DEVICE — WARMING BLANKET FULL UNDERBODY

## (undated) DEVICE — NERVE LOCATOR  III

## (undated) DEVICE — DRSG 4 X 4" 4PLY STERILE

## (undated) DEVICE — GLV 8 PROTEXIS (WHITE)

## (undated) DEVICE — FRAZIER SUCTION TIP 12FR

## (undated) DEVICE — SUT MONOCRYL 5-0 18" P-3 UNDYED

## (undated) DEVICE — ELCTR BOVIE TIP BLADE INSULATED 2.75" EDGE

## (undated) DEVICE — DRAPE INSTRUMENT POUCH 6.75" X 11"

## (undated) DEVICE — ELCTR BOVIE TIP NEEDLE INSULATED 2.8" EDGE

## (undated) DEVICE — PROTECTOR CORNEAL BLUE ADULT

## (undated) DEVICE — SUT SILK 4-0 18" P-3